# Patient Record
Sex: FEMALE | Race: WHITE | NOT HISPANIC OR LATINO | Employment: UNEMPLOYED | URBAN - METROPOLITAN AREA
[De-identification: names, ages, dates, MRNs, and addresses within clinical notes are randomized per-mention and may not be internally consistent; named-entity substitution may affect disease eponyms.]

---

## 2017-01-04 ENCOUNTER — ALLSCRIPTS OFFICE VISIT (OUTPATIENT)
Dept: OTHER | Facility: OTHER | Age: 62
End: 2017-01-04

## 2017-01-06 ENCOUNTER — APPOINTMENT (OUTPATIENT)
Dept: PHYSICAL THERAPY | Facility: MEDICAL CENTER | Age: 62
End: 2017-01-06
Payer: COMMERCIAL

## 2017-01-06 PROCEDURE — 97010 HOT OR COLD PACKS THERAPY: CPT

## 2017-01-06 PROCEDURE — 97110 THERAPEUTIC EXERCISES: CPT

## 2017-01-06 PROCEDURE — 97012 MECHANICAL TRACTION THERAPY: CPT

## 2017-01-09 ENCOUNTER — GENERIC CONVERSION - ENCOUNTER (OUTPATIENT)
Dept: OTHER | Facility: OTHER | Age: 62
End: 2017-01-09

## 2017-01-09 ENCOUNTER — APPOINTMENT (OUTPATIENT)
Dept: PHYSICAL THERAPY | Facility: MEDICAL CENTER | Age: 62
End: 2017-01-09
Payer: COMMERCIAL

## 2017-01-09 PROCEDURE — 97012 MECHANICAL TRACTION THERAPY: CPT

## 2017-01-09 PROCEDURE — 97110 THERAPEUTIC EXERCISES: CPT

## 2017-01-09 PROCEDURE — 97010 HOT OR COLD PACKS THERAPY: CPT

## 2017-01-19 ENCOUNTER — APPOINTMENT (OUTPATIENT)
Dept: LAB | Facility: MEDICAL CENTER | Age: 62
End: 2017-01-19
Payer: COMMERCIAL

## 2017-01-19 DIAGNOSIS — E78.5 HYPERLIPIDEMIA: ICD-10-CM

## 2017-01-19 LAB
CHOLEST SERPL-MCNC: 173 MG/DL (ref 50–200)
HDLC SERPL-MCNC: 85 MG/DL (ref 40–60)
LDLC SERPL CALC-MCNC: 73 MG/DL (ref 0–100)
TRIGL SERPL-MCNC: 75 MG/DL

## 2017-01-19 PROCEDURE — 36415 COLL VENOUS BLD VENIPUNCTURE: CPT

## 2017-01-19 PROCEDURE — 80061 LIPID PANEL: CPT

## 2017-02-19 ENCOUNTER — OFFICE VISIT (OUTPATIENT)
Dept: URGENT CARE | Facility: MEDICAL CENTER | Age: 62
End: 2017-02-19
Payer: COMMERCIAL

## 2017-02-19 PROCEDURE — 99213 OFFICE O/P EST LOW 20 MIN: CPT

## 2017-09-11 ENCOUNTER — TRANSCRIBE ORDERS (OUTPATIENT)
Dept: ADMINISTRATIVE | Facility: HOSPITAL | Age: 62
End: 2017-09-11

## 2017-09-11 DIAGNOSIS — M81.8 OTHER OSTEOPOROSIS, UNSPECIFIED PATHOLOGICAL FRACTURE PRESENCE: Primary | ICD-10-CM

## 2017-10-09 ENCOUNTER — ALLSCRIPTS OFFICE VISIT (OUTPATIENT)
Dept: OTHER | Facility: OTHER | Age: 62
End: 2017-10-09

## 2017-10-09 DIAGNOSIS — M81.0 AGE-RELATED OSTEOPOROSIS WITHOUT CURRENT PATHOLOGICAL FRACTURE: ICD-10-CM

## 2017-10-09 DIAGNOSIS — Z12.11 ENCOUNTER FOR SCREENING FOR MALIGNANT NEOPLASM OF COLON: ICD-10-CM

## 2017-10-09 DIAGNOSIS — Z12.31 ENCOUNTER FOR SCREENING MAMMOGRAM FOR MALIGNANT NEOPLASM OF BREAST: ICD-10-CM

## 2017-10-09 DIAGNOSIS — I10 ESSENTIAL (PRIMARY) HYPERTENSION: ICD-10-CM

## 2017-10-09 DIAGNOSIS — Z87.891 PERSONAL HISTORY OF NICOTINE DEPENDENCE: ICD-10-CM

## 2017-10-09 DIAGNOSIS — E78.5 HYPERLIPIDEMIA: ICD-10-CM

## 2017-10-20 ENCOUNTER — APPOINTMENT (OUTPATIENT)
Dept: LAB | Facility: MEDICAL CENTER | Age: 62
End: 2017-10-20
Payer: COMMERCIAL

## 2017-10-20 DIAGNOSIS — E78.5 HYPERLIPIDEMIA: ICD-10-CM

## 2017-10-20 DIAGNOSIS — I10 ESSENTIAL (PRIMARY) HYPERTENSION: ICD-10-CM

## 2017-10-20 LAB
ALBUMIN SERPL BCP-MCNC: 3.7 G/DL (ref 3.5–5)
ALP SERPL-CCNC: 66 U/L (ref 46–116)
ALT SERPL W P-5'-P-CCNC: 32 U/L (ref 12–78)
ANION GAP SERPL CALCULATED.3IONS-SCNC: 7 MMOL/L (ref 4–13)
AST SERPL W P-5'-P-CCNC: 18 U/L (ref 5–45)
BASOPHILS # BLD AUTO: 0.03 THOUSANDS/ΜL (ref 0–0.1)
BASOPHILS NFR BLD AUTO: 0 % (ref 0–1)
BILIRUB SERPL-MCNC: 0.44 MG/DL (ref 0.2–1)
BUN SERPL-MCNC: 10 MG/DL (ref 5–25)
CALCIUM SERPL-MCNC: 9.2 MG/DL (ref 8.3–10.1)
CHLORIDE SERPL-SCNC: 97 MMOL/L (ref 100–108)
CHOLEST SERPL-MCNC: 169 MG/DL (ref 50–200)
CO2 SERPL-SCNC: 27 MMOL/L (ref 21–32)
CREAT SERPL-MCNC: 0.58 MG/DL (ref 0.6–1.3)
EOSINOPHIL # BLD AUTO: 0.09 THOUSAND/ΜL (ref 0–0.61)
EOSINOPHIL NFR BLD AUTO: 1 % (ref 0–6)
ERYTHROCYTE [DISTWIDTH] IN BLOOD BY AUTOMATED COUNT: 12.9 % (ref 11.6–15.1)
GFR SERPL CREATININE-BSD FRML MDRD: 98 ML/MIN/1.73SQ M
GLUCOSE P FAST SERPL-MCNC: 97 MG/DL (ref 65–99)
HCT VFR BLD AUTO: 40.6 % (ref 34.8–46.1)
HDLC SERPL-MCNC: 76 MG/DL (ref 40–60)
HGB BLD-MCNC: 14 G/DL (ref 11.5–15.4)
LDLC SERPL CALC-MCNC: 74 MG/DL (ref 0–100)
LYMPHOCYTES # BLD AUTO: 2.21 THOUSANDS/ΜL (ref 0.6–4.47)
LYMPHOCYTES NFR BLD AUTO: 33 % (ref 14–44)
MCH RBC QN AUTO: 30.7 PG (ref 26.8–34.3)
MCHC RBC AUTO-ENTMCNC: 34.5 G/DL (ref 31.4–37.4)
MCV RBC AUTO: 89 FL (ref 82–98)
MONOCYTES # BLD AUTO: 0.69 THOUSAND/ΜL (ref 0.17–1.22)
MONOCYTES NFR BLD AUTO: 10 % (ref 4–12)
NEUTROPHILS # BLD AUTO: 3.67 THOUSANDS/ΜL (ref 1.85–7.62)
NEUTS SEG NFR BLD AUTO: 56 % (ref 43–75)
NRBC BLD AUTO-RTO: 0 /100 WBCS
PLATELET # BLD AUTO: 295 THOUSANDS/UL (ref 149–390)
PMV BLD AUTO: 10.3 FL (ref 8.9–12.7)
POTASSIUM SERPL-SCNC: 4.2 MMOL/L (ref 3.5–5.3)
PROT SERPL-MCNC: 7.4 G/DL (ref 6.4–8.2)
RBC # BLD AUTO: 4.56 MILLION/UL (ref 3.81–5.12)
SODIUM SERPL-SCNC: 131 MMOL/L (ref 136–145)
TRIGL SERPL-MCNC: 93 MG/DL
TSH SERPL DL<=0.05 MIU/L-ACNC: 0.72 UIU/ML (ref 0.36–3.74)
WBC # BLD AUTO: 6.7 THOUSAND/UL (ref 4.31–10.16)

## 2017-10-20 PROCEDURE — 80061 LIPID PANEL: CPT

## 2017-10-20 PROCEDURE — 36415 COLL VENOUS BLD VENIPUNCTURE: CPT

## 2017-10-20 PROCEDURE — 85025 COMPLETE CBC W/AUTO DIFF WBC: CPT

## 2017-10-20 PROCEDURE — 84443 ASSAY THYROID STIM HORMONE: CPT

## 2017-10-20 PROCEDURE — 80053 COMPREHEN METABOLIC PANEL: CPT

## 2017-10-23 ENCOUNTER — APPOINTMENT (OUTPATIENT)
Dept: LAB | Facility: MEDICAL CENTER | Age: 62
End: 2017-10-23
Payer: COMMERCIAL

## 2017-10-23 DIAGNOSIS — Z12.11 ENCOUNTER FOR SCREENING FOR MALIGNANT NEOPLASM OF COLON: ICD-10-CM

## 2017-10-23 LAB — HEMOCCULT STL QL IA: POSITIVE

## 2017-10-23 PROCEDURE — G0328 FECAL BLOOD SCRN IMMUNOASSAY: HCPCS

## 2017-11-08 ENCOUNTER — HOSPITAL ENCOUNTER (OUTPATIENT)
Dept: RADIOLOGY | Facility: MEDICAL CENTER | Age: 62
Discharge: HOME | End: 2017-11-08
Payer: COMMERCIAL

## 2017-11-08 ENCOUNTER — ALLSCRIPTS OFFICE VISIT (OUTPATIENT)
Dept: OTHER | Facility: OTHER | Age: 62
End: 2017-11-08

## 2017-11-08 DIAGNOSIS — Z87.891 PERSONAL HISTORY OF NICOTINE DEPENDENCE: ICD-10-CM

## 2017-11-08 DIAGNOSIS — Z12.31 ENCOUNTER FOR SCREENING MAMMOGRAM FOR MALIGNANT NEOPLASM OF BREAST: ICD-10-CM

## 2017-11-08 DIAGNOSIS — M81.8 OTHER OSTEOPOROSIS, UNSPECIFIED PATHOLOGICAL FRACTURE PRESENCE: ICD-10-CM

## 2017-11-08 PROCEDURE — 77063 BREAST TOMOSYNTHESIS BI: CPT

## 2017-11-08 PROCEDURE — G0202 SCR MAMMO BI INCL CAD: HCPCS

## 2017-11-08 PROCEDURE — 77080 DXA BONE DENSITY AXIAL: CPT

## 2017-11-24 DIAGNOSIS — E78.5 HYPERLIPIDEMIA: ICD-10-CM

## 2017-11-24 DIAGNOSIS — I10 ESSENTIAL (PRIMARY) HYPERTENSION: ICD-10-CM

## 2017-11-27 ENCOUNTER — APPOINTMENT (OUTPATIENT)
Dept: LAB | Facility: MEDICAL CENTER | Age: 62
End: 2017-11-27
Payer: COMMERCIAL

## 2017-11-27 DIAGNOSIS — I10 ESSENTIAL (PRIMARY) HYPERTENSION: ICD-10-CM

## 2017-11-27 DIAGNOSIS — E78.5 HYPERLIPIDEMIA: ICD-10-CM

## 2017-11-27 LAB
ANION GAP SERPL CALCULATED.3IONS-SCNC: 6 MMOL/L (ref 4–13)
BUN SERPL-MCNC: 17 MG/DL (ref 5–25)
CALCIUM SERPL-MCNC: 9.3 MG/DL (ref 8.3–10.1)
CHLORIDE SERPL-SCNC: 102 MMOL/L (ref 100–108)
CO2 SERPL-SCNC: 27 MMOL/L (ref 21–32)
CREAT SERPL-MCNC: 0.65 MG/DL (ref 0.6–1.3)
GFR SERPL CREATININE-BSD FRML MDRD: 94 ML/MIN/1.73SQ M
GLUCOSE P FAST SERPL-MCNC: 97 MG/DL (ref 65–99)
POTASSIUM SERPL-SCNC: 4.2 MMOL/L (ref 3.5–5.3)
SODIUM SERPL-SCNC: 135 MMOL/L (ref 136–145)

## 2017-11-27 PROCEDURE — 80048 BASIC METABOLIC PNL TOTAL CA: CPT

## 2017-11-27 PROCEDURE — 36415 COLL VENOUS BLD VENIPUNCTURE: CPT

## 2017-12-15 ENCOUNTER — ALLSCRIPTS OFFICE VISIT (OUTPATIENT)
Dept: OTHER | Facility: OTHER | Age: 62
End: 2017-12-15

## 2017-12-16 NOTE — PROGRESS NOTES
Assessment  1  Sinusitis (473 9) (J32 9)   2  Heart murmur (785 2) (R01 1)    Plan  Heart murmur, Sinusitis    · Follow-up visit in 2 weeks Evaluation and Treatment  Follow-up  Status: Hold For -Scheduling  Requested for: 35MHY0966  Sinusitis    · Amoxicillin-Pot Clavulanate 875-125 MG Oral Tablet; TAKE 1 TABLET EVERY 12HOURS DAILY    Discussion/Summary    Sinusitis: Likely bacterial as patient has tooth pain and temperature elevation  Will have her use Augmentin twice daily for 10 days  Patient instructed to yogurt while on antibiotics to help prevent diarrhea  Patient encouraged to stop smoking  Murmur: On exam  Recommended follow-up with PCP for workup  Follow-up in 2 weeks or sooner if needed  Possible side effects of new medications were reviewed with the patient/guardian today  The treatment plan was reviewed with the patient/guardian  The patient/guardian understands and agrees with the treatment plan      Chief Complaint  1  Cough   2  Nasal Symptoms    History of Present Illness  HPI: Patient presents with sinus pain  Started 4 days ago  Location is in her teeth  Described as a pressure sensation  Has associated nasal congestion and cough  Symptoms are getting worse  Symptoms are exacerbated when he turns on in the house  Not improved by anything  Tried some over-the-counter medication with little relief  Patient is a smoker  No history of asthma  Patient has sick contacts at home  Review of Systems   Constitutional: fever  Cardiovascular: no chest pain  Respiratory: no shortness of breath  Active Problems  1  Abnormal findings on diagnostic imaging of liver (793 3) (R93 2)   2  Acute foot pain, right (729 5) (M79 671)   3  Acute upper respiratory infection (465 9) (J06 9)   4  Anxiety (300 00) (F41 9)   5  Back pain (724 5) (M54 9)   6  Breast cancer screening (V76 10) (Z12 39)   7  Colon cancer screening (V76 51) (Z12 11)   8  Current tobacco use (305 1) (Z72 0)   9   Encounter for screening mammogram for malignant neoplasm of breast (V76 12) (Z12 31)   10  Encounter for vitamin deficiency screening (V77 99) (Z13 21)   11  Flu vaccine need (V04 81) (Z23)   12  Hemoptysis (786 30) (R04 2)   13  Hyperlipidemia (272 4) (E78 5)   14  Hypertension (401 9) (I10)   15  Intervertebral disc disorders with radiculopathy, lumbar region (724 4) (M51 16)   16  Lumbar stenosis (724 02) (M48 061)   17  Need for pneumococcal vaccination (V03 82) (Z23)   18  Need for pneumococcal vaccine (V03 82) (Z23)   19  Need for shingles vaccine (V04 89) (Z23)   20  Need for Tdap vaccination (V06 1) (Z23)   21  Osteoporosis (733 00) (M81 0)   22  Overweight (278 02) (E66 3)   23  Pain of upper extremity (729 5) (M79 603)   24  Positive FIT (fecal immunochemical test) (792 1) (R19 5)   25  Right hip pain (719 45) (M25 551)   26  Right shoulder pain (719 41) (M25 511)   27  Skin lesion (709 9) (L98 9)   28  Stiffness of right shoulder joint (719 51) (M25 611)   29  Tendinitis of right rotator cuff (726 10) (M75 81)   30  Visit for routine gyn exam (V72 31) (Z01 419)   31  Well adult on routine health check (V70 0) (Z00 00)   32  Yeast infection (112 9) (B37 9)    Past Medical History  1  History of Screening for thyroid disorder (V77 0) (Z13 29)   2  History of UTI symptoms (788 99) (R39 9)  Active Problems And Past Medical History Reviewed: The active problems and past medical history were reviewed and updated today  Family History  Mother    1  Family history of Heart Disease (V17 49)   2  Family history of Mother  At Age 80  Father    3  Family history of Diabetes Mellitus (V18 0)   4  Family history of Father  At Age 80   5  Family history of Heart Disease (V17 49)    Social History   · Caffeine Use   · Current tobacco use (305 1) (Z72 0)   · Former smoker (T75 42) (X48 542)   · Never Drank Alcohol   · Use of electronic cigarettes    Current Meds   1   Alendronate Sodium 70 MG Oral Tablet; take 1 tablet once weekly; Therapy: 98XFU1336 to (Evaluate:19Jan2018)  Requested for: 34HLX5749; Last Rx:91Azw0961 Ordered   2  Aspirin EC 81 MG Oral Tablet Delayed Release; Take 1 tablet daily Recorded   3  Atorvastatin Calcium 10 MG Oral Tablet; take 1 tablet by mouth every day; Therapy: 93SYC6151 to (Jorge Amanda)  Requested for: 93MYH4974; Last Rx:30Nov2017 Ordered   4  Calcium Plus Vitamin D CAPS; Therapy: (Recorded:83Zal6030) to Recorded   5  Cranberry CAPS; Therapy: (Recorded:27Kly5536) to Recorded   6  [employer] Red Yeast Rice CAPS; TAKE AS DIRECTED; Therapy: (Recorded:10Nvv1696) to Recorded   7  Cyclobenzaprine HCl - 10 MG Oral Tablet; one tablet tid prn; Therapy: 61LGF8243 to (Last Rx:09Oct2017)  Requested for: 18CHS1824; Status: ACTIVE - Renewal Denied Ordered   8  Fish Oil 1000 MG Oral Capsule; Therapy: (Recorded:29Wdn4212) to Recorded   9  Fluticasone Propionate 50 MCG/ACT Nasal Suspension; USE 2 SPRAYS IN EACH NOSTRIL ONCE DAILY Recorded   10  Lisinopril 20 MG Oral Tablet; TAKE 1 TABLET DAILY; Therapy: 51GSR0976 to (CFZHXJVI:52OEF0932)  Requested for: 58GHV4356; Last  Rx:42Ocs5117 Ordered   11  LORazepam 1 MG Oral Tablet; One tablet bid prn; Therapy: 27AEB5883 to (Last Rx:09Oct2017) Ordered   12  Multi Vitamin/Minerals TABS; TAKE 1 TABLET DAILY; Therapy: (Recorded:07Tpm9093) to Recorded   13  TraMADol HCl - 50 MG Oral Tablet; TAKE 1 TABLET EVERY 6 HOURS WITH FOOD; Therapy: 55DIG3609 to (John Graves)  Requested for: 56SIV6633; Last  Rx:04Nov2016 Ordered   14  Vitamin C CAPS; Therapy: (Recorded:97Zku0988) to Recorded   15  Vitamin E TABS; Therapy: (Recorded:30Tcl5293) to Recorded    The medication list was reviewed and updated today  Allergies  1   No Known Drug Allergies    Vitals   Recorded: 81Szb7193 01:16PM   Temperature 99 9 F   Heart Rate 72   Respiration 16   Systolic 485   Diastolic 70   Weight 592 lb    BMI Calculated 30 3   BSA Calculated 1 63     Physical Exam   Constitutional  General appearance: Abnormal   acutely ill  Eyes  Conjunctiva and lids: No swelling, erythema or discharge  Pupils and irises: Equal, round and reactive to light  Ears, Nose, Mouth, and Throat  External inspection of ears and nose: Normal    Otoscopic examination: Tympanic membranes translucent with normal light reflex  Canals patent without erythema  Nasal mucosa, septum, and turbinates: Abnormal   There was a purulent discharge from both nares  The bilateral nasal mucosa was edematous-- and-- red, but-- not pale/blue  Oropharynx: Abnormal   The posterior pharynx Postnasal drainage  , but-- was not erythematous-- and-- did not have an exudate  Pulmonary  Respiratory effort: No increased work of breathing or signs of respiratory distress  Auscultation of lungs: Clear to auscultation  Cardiovascular  Auscultation of heart: Abnormal   The heart rate was normal  The rhythm was regular  Heart sounds: normal S1-- and-- normal S2  A grade 2 systolic murmur was heard at the RUSB  Examination of extremities for edema and/or varicosities: Normal    Lymphatic  Palpation of lymph nodes in neck: Abnormal   bilateral anterior cervical node enlargement, but-- no posterior cervical node enlargement  Future Appointments    Date/Time Provider Specialty Site   10/10/2018 01:30 PM ANTHONY Arango  [address]   12/20/2017 05:30 PM ANTHONY Fletcher   G[facility]ology Adult [address]     Signatures   Electronically signed by : Caleb Clifford DO; Dec 15 2017  1:38PM EST                       (Author)

## 2017-12-20 ENCOUNTER — ALLSCRIPTS OFFICE VISIT (OUTPATIENT)
Dept: OTHER | Facility: OTHER | Age: 62
End: 2017-12-20

## 2018-01-08 ENCOUNTER — ALLSCRIPTS OFFICE VISIT (OUTPATIENT)
Dept: OTHER | Facility: OTHER | Age: 63
End: 2018-01-08

## 2018-01-10 NOTE — PROGRESS NOTES
Assessment    1  Osteoporosis (733 00) (M81 0)   2  Hyperlipidemia (272 4) (E78 5)   3  Hypertension (401 9) (I10)   4  Current tobacco use (305 1) (Z72 0)   5  Encounter for preventive health examination (V70 0) (Z00 00)   6  Overweight (278 02) (E66 3)    Plan  Anxiety    · LORazepam 1 MG Oral Tablet (Ativan); One tablet bid prn  Back pain    · Cyclobenzaprine HCl - 10 MG Oral Tablet; one tablet tid prn  Colon cancer screening    · (1) OCCULT BLOOD, FECAL IMMUNOCHEMICAL TEST; Status:Active - Retrospective  Authorization; Requested OFA:[id number];   Encounter for screening mammogram for malignant neoplasm of breast    · * MAMMO SCREENING BILATERAL W CAD; Status:Active - Retrospective Authorization; Requested YYN:[id number];   Flu vaccine need    · Fluzone Quadrivalent 0 5 ML Intramuscular Suspension Prefilled Syringe  Health Maintenance    · Begin or continue regular aerobic exercise  Gradually work up to at least 3 sessions of 30  minutes of exercise a week ; Status:Complete;   Done: 98APK1371  Hyperlipidemia, Hypertension    · (1) CBC/PLT/DIFF; Status:Active - Retrospective Authorization; Requested  VWB:[id number];    · (1) COMPREHENSIVE METABOLIC PANEL; Status:Active - Retrospective Authorization; Requested YTP:[id number];    · (1) LIPID PANEL, FASTING; Status:Active - Retrospective Authorization; Requested  XSF:[id number];    · (1) TSH; Status:Active - Retrospective Authorization; Requested MHR:[id number];   Osteoporosis    · * DXA BONE DENSITY SPINE HIP AND PELVIS; Status:Active - Retrospective  Authorization; Requested VRM:[id number]; Overweight    · We recommend that you bring your body mass index down to 26 ; Status:Complete;    Done: 00GMV3847   · You need to quit smoking ; Status:Complete;   Done: 37SGB4526  SocHx: Former smoker    · * CT LUNG SCREENING PROGRAM; Status:Active - Retrospective Authorization; Requested LXU:[id number];     Flu shot    Mammogram, Dexa scan, CT lung , hemoccult  Discussion/Summary    1  HTN-controlled  2  Cigarette smoker-discussed cessation  Offered CT lung cancer screening  3  Hyperlipidemia-due for BW  4  Overweight-advised weight loss  Needs to exercise  5  HM-needs mammogram, Dexa scan, flu shot  Will check Zostavax, Tdap  Eye checkup  Prefers hemoccult  Chief Complaint  The patient is here today for her yearly exam       History of Present Illness  HM, Adult Female: The patient is being seen for a health maintenance evaluation  General Health: The patient's health since the last visit is described as good  She has regular dental visits  She denies vision problems  Vision care includes an eye examination more than a year ago and Dr  She denies hearing loss  Lifestyle:  She consumes a diverse and healthy diet  (Eats whole grains, vegetables  Dietary calcium 1 daily  Caffiene 2 daily  )   She has weight concerns  She does not exercise regularly  (She has not been exercising; says she can't do it in the heat  )   She uses tobacco  (She quits smoking off and on  She says it is hard but she wants  )   She consumes alcohol  She reports occasional alcohol use  Screening:   HPI: Lexi Gutierrez says she has been doing well  She declines Gyn exam today  Her BP has been good  She is getting a root canal   Dr Diane Vitale  She is concerned about a lump in her jaw  No gout episodes  She is taking alendronate for her osteoporosis  No problems  Active Problems    1  Abnormal findings on diagnostic imaging of liver (793 3) (R93 2)   2  Acute foot pain, right (729 5) (M79 671)   3  Acute upper respiratory infection (465 9) (J06 9)   4  Anxiety (300 00) (F41 9)   5  Back pain (724 5) (M54 9)   6  Breast cancer screening (V76 10) (Z12 39)   7  Colon cancer screening (V76 51) (Z12 11)   8  Current tobacco use (305 1) (Z72 0)   9  Encounter for screening mammogram for malignant neoplasm of breast (V76 12)   (Z12 31)   10   Encounter for vitamin deficiency screening (V77 99) (Z13 21)   11  Flu vaccine need (V04 81) (Z23)   12  Hemoptysis (786 30) (R04 2)   13  Hyperlipidemia (272 4) (E78 5)   14  Hypertension (401 9) (I10)   15  Intervertebral disc disorders with radiculopathy, lumbar region (724 4) (M51 16)   16  Lumbar stenosis (724 02) (M48 061)   17  Need for pneumococcal vaccination (V03 82) (Z23)   18  Need for pneumococcal vaccine (V03 82) (Z23)   19  Osteoporosis (733 00) (M81 0)   20  Pain of upper extremity (729 5) (M79 603)   21  Right hip pain (719 45) (M25 551)   22  Right shoulder pain (719 41) (M25 511)   23  Skin lesion (709 9) (L98 9)   24  Stiffness of right shoulder joint (719 51) (M25 611)   25  Tendinitis of right rotator cuff (726 10) (M75 81)   26  Visit for routine gyn exam (V72 31) (Z01 419)   27  Well adult on routine health check (V70 0) (Z00 00)   28  Yeast infection (112 9) (B37 9)    Past Medical History    · History of Screening for thyroid disorder (V77 0) (Z13 29)   · History of UTI symptoms (788 99) (R39 9)    Family History  Mother    · Family history of Heart Disease (V17 49)   · Family history of Mother  At Age 80  Father    · Family history of Diabetes Mellitus (V18 0)   · Family history of Father  At Age 80   · Family history of Heart Disease (V17 49)    The family history was reviewed and updated today  Social History    · Caffeine Use   · Current tobacco use (305 1) (Z72 0)   · Former smoker (A74 46) (Z94 156)   · Never Drank Alcohol   · Use of electronic cigarettes    Current Meds   1  Alendronate Sodium 70 MG Oral Tablet; take 1 tablet once weekly; Therapy: 20JBS2139 to (Evaluate:2018)  Requested for: 24IND6010; Last   Rx:36Obl9166 Ordered   2  Aspirin EC 81 MG Oral Tablet Delayed Release; Take 1 tablet daily Recorded   3  Atorvastatin Calcium 10 MG Oral Tablet; take 1 tablet by mouth every day; Therapy: 36MGQ3552 to (Evaluate:82Lfh8015)  Requested for: 96WIQ7883; Last   Rx:2017 Ordered   4   Calcium Plus Vitamin D CAPS; Therapy: (Recorded:65Ljx8368) to Recorded   5  Cranberry CAPS; Therapy: (Recorded:72Gqb6692) to Recorded   6  [employer] Red Yeast Rice CAPS; TAKE AS DIRECTED; Therapy: (Recorded:44Iza4349) to Recorded   7  Cyclobenzaprine HCl - 10 MG Oral Tablet; one tablet tid prn; Therapy: 84DNT7970 to (Last Rx:10Oct2016)  Requested for: 16Oct2016 Ordered   8  Fish Oil 1000 MG Oral Capsule; Therapy: (Recorded:28Fin3548) to Recorded   9  Fluticasone Propionate 50 MCG/ACT Nasal Suspension; USE 2 SPRAYS IN EACH   NOSTRIL ONCE DAILY Recorded   10  Lisinopril-Hydrochlorothiazide 10-12 5 MG Oral Tablet; take 1 tablet by mouth every day; Therapy: 05NCC7802 to (Evaluate:10Oct2017)  Requested for: 24MZQ1100; Last    Rx:14Mar2017 Ordered   11  LORazepam 1 MG Oral Tablet; One tablet bid prn; Therapy: 61FUH7744 to (Last Rx:10Oct2016) Ordered   12  Multi Vitamin/Minerals TABS; TAKE 1 TABLET DAILY; Therapy: (Recorded:86Yvn1186) to Recorded   13  TraMADol HCl - 50 MG Oral Tablet; TAKE 1 TABLET EVERY 6 HOURS WITH FOOD; Therapy: 01JKS4261 to (Marv Mclain)  Requested for: 54KPY3662; Last    Rx:04Nov2016 Ordered   14  Vitamin C CAPS; Therapy: (Recorded:82Isr0545) to Recorded   15  Vitamin E TABS; Therapy: (Recorded:54Jap7381) to Recorded    Allergies    1  No Known Drug Allergies    Vitals   Recorded: 37SAM6197 04:56PM Recorded: 19JRR6452 12:58PM   Heart Rate  78   Respiration  18   Systolic 579,    Diastolic 80, LUE 80   Weight  151 lb 8 oz   BMI Calculated  30 6   BSA Calculated  1 64     Physical Exam    Constitutional   General appearance: No acute distress, well appearing and well nourished  Head and Face   Head and face: Normal     Ears, Nose, Mouth, and Throat   Otoscopic examination: Tympanic membranes translucent with normal light reflex  Canals patent without erythema  Oropharynx: Normal with no erythema, edema, exudate or lesions      Neck   Neck: Supple, symmetric, trachea midline, no masses  Thyroid: Normal, no thyromegaly  Pulmonary   Respiratory effort: No increased work of breathing or signs of respiratory distress  Auscultation of lungs: Clear to auscultation  Cardiovascular   Auscultation of heart: Normal rate and rhythm, normal S1 and S2, no murmurs  Carotid pulses: 2+ bilaterally  Abdominal aorta: Normal     Femoral pulses: 2+ bilaterally  Pedal pulses: 2+ bilaterally  Peripheral vascular exam: Normal     Examination of extremities for edema and/or varicosities: Normal     Lymphatic   Palpation of lymph nodes in neck: No lymphadenopathy  Palpation of lymph nodes in groin: No lymphadenopathy         Signatures   Electronically signed by : ANTHONY Cagle ; Oct  9 2017  5:04PM EST                       (Author)

## 2018-01-12 VITALS
SYSTOLIC BLOOD PRESSURE: 120 MMHG | WEIGHT: 142 LBS | TEMPERATURE: 98.5 F | BODY MASS INDEX: 28.63 KG/M2 | HEIGHT: 59 IN | HEART RATE: 78 BPM | DIASTOLIC BLOOD PRESSURE: 68 MMHG | RESPIRATION RATE: 18 BRPM

## 2018-01-14 VITALS
BODY MASS INDEX: 30.6 KG/M2 | SYSTOLIC BLOOD PRESSURE: 140 MMHG | DIASTOLIC BLOOD PRESSURE: 80 MMHG | RESPIRATION RATE: 18 BRPM | WEIGHT: 151.5 LBS | HEART RATE: 78 BPM

## 2018-01-15 NOTE — PROGRESS NOTES
Assessment    1  Acute foot pain, right (729 5) (M79 671)   2  Breast cancer screening (V76 10) (Z12 39)   3  Hypertension (401 9) (I10)   4  Encounter for vitamin deficiency screening (V77 99) (Z13 21)   5  Hyperlipidemia (272 4) (E78 5)   6  Visit for routine gyn exam (V72 31) (Z01 419)   7  Osteoporosis (733 00) (M81 0)    Plan  Acute foot pain, right    · * XR FOOT 3+ VIEW RIGHT; Status:Active - Retrospective By Protocol Authorization; Requested for:05Oct2016;   Acute foot pain, right, Hypertension    · (1) URIC ACID; Status:Active - Retrospective Authorization; Requested for:05Oct2016;   Breast cancer screening    · * MAMMO SCREENING BILATERAL W CAD; Status:Hold For - Scheduling,Retrospective  By Protocol Authorization; Requested for:28Oct2016;   Encounter for vitamin deficiency screening, Osteoporosis    · (1) VITAMIN D 25-HYDROXY; Status:Active - Retrospective Authorization; Requested  for:05Oct2016;   Hyperlipidemia, Hypertension    · (1) LIPID PANEL, FASTING; Status:Active - Retrospective Authorization; Requested  for:05Oct2016;   Hypertension    · (1) CBC/PLT/DIFF; Status:Active - Retrospective Authorization; Requested  for:05Oct2016;    · (1) COMPREHENSIVE METABOLIC PANEL; Status:Active - Retrospective Authorization; Requested for:05Oct2016;    · (1) OCCULT BLOOD, FECAL IMMUNOCHEMICAL TEST; Status:Active - Retrospective By  Protocol Authorization; Requested for:05Oct2016;    · (1) TSH; Status:Active - Retrospective Authorization; Requested for:05Oct2016;   Osteoporosis    · * DXA BONE DENSITY SPINE HIP AND PELVIS; Status:Hold For -  Scheduling,Retrospective By Protocol Authorization; Requested [id number];   Right shoulder pain    · TraMADol HCl - 50 MG Oral Tablet  Visit for routine gyn exam    · (Q) THINPREP TIS PAP RFX HPV; Status:Active - Retrospective By Protocol  Authorization;  Requested DBJ:[id number];   PAP: : 10/1/2014  : 3/2002  Well adult on routine health check    · Garlic CAPS    Check BW, uric acid, hemoccult, mammogram  Dexa scan next year  XRay right foot  Flu shot  Discussion/Summary    1  HM-due for mammogram , hemoccult  Updated immunizations  Flu shot today  2  Osteoporosis-doing well with alendronate  Last Dexa scan 12/15; will repeat next year  3  HTN-borderline  Monitor at home  Ami [employer]end However if has gout may stop the HCTZ and will need med adjustment  4  Foot pain-suspect gout  5  Hyperlipidemia-on red yeast rice  Due for BW   6  Gyn exam     Chief Complaint  Complains of right foot pain for 2 weeks  History of Present Illness  HM, Adult Female: The patient is being seen for a health maintenance and gynecology evaluation  General Health: The patient's health since the last visit is described as good  She has regular dental visits  She denies vision problems  Vision care includes an eye examination more than a year ago  She has hearing loss  hearing is slightly decreased  Lifestyle:  She has weight concerns  She exercises regularly  She does not use tobacco  (She has been using ecigarette but says she has not been smoking  )   She denies alcohol use  Dietary calcium several daily  Reproductive health: the patient is postmenopausal    Screening:   HPI: Austin nelson complains of pain in her left foot for the past 2 weeks  Very painful to walk  to walk  Her BP was taken at the dentist and was normal  Taking lisinopril/HCTZ and doing well  Taking her alendronate and doing well  She does get heartburn more often  Seems about 1-2 times a week  Aggravated by eating late, tomato sauce  Shge        Review of Systems    Genitourinary: Has stress incontinence with a cough occ , but no dysuria, no pelvic pain, no vaginal discharge and no unexplained vaginal bleeding  Active Problems    1  Abnormal findings on diagnostic imaging of liver (793 3) (R93 2)   2  Anxiety (300 00) (F41 9)   3  Back pain (724 5) (M54 9)   4  Colon cancer screening (V76 51) (Z12 11)   5   Current tobacco use (305 1) (Z72 0)   6  Encounter for screening mammogram for malignant neoplasm of breast (V76 12)   (Z12 31)   7  Flu vaccine need (V04 81) (Z23)   8  Hemoptysis (786 30) (R04 2)   9  Hyperlipidemia (272 4) (E78 5)   10  Hypertension (401 9) (I10)   11  Need for pneumococcal vaccination (V03 82) (Z23)   12  Osteoporosis (733 00) (M81 0)   13  Pain of upper extremity (729 5) (M79 603)   14  Right shoulder pain (719 41) (M25 511)   15  Skin lesion (709 9) (L98 9)   16  Stiffness of right shoulder joint (719 51) (M25 611)   17  Tendinitis of right rotator cuff (726 10) (M75 81)   18  Visit for routine gyn exam (V72 31) (Z01 419)   19  Well adult on routine health check (V70 0) (Z00 00)    Past Medical History    · History of Screening for thyroid disorder (V77 0) (Z13 29)   · History of UTI symptoms (788 99) (R39 9)    Family History  Mother    · Family history of Heart Disease (V17 49)   · Family history of Mother  At Age 80  Father    · Family history of Diabetes Mellitus (V18 0)   · Family history of Father  At Age 80   · Family history of Heart Disease (V17 49)    Social History    · Caffeine Use   · Current tobacco use (305 1) (Z72 0)   · Former smoker (V15 82) (I33 813)   · Never Drank Alcohol   · Use of electronic cigarettes    Current Meds   1  Alendronate Sodium 70 MG Oral Tablet; take 1 tablet once weekly; Therapy: 86MYP6138 to (Evaluate:85Hsz7192)  Requested for: 2016; Last   Rx:2016 Ordered   2  Aspirin EC 81 MG Oral Tablet Delayed Release; Take 1 tablet daily Recorded   3  Calcium Plus Vitamin D CAPS; Therapy: (Recorded:04Apx2519) to Recorded   4  Cranberry CAPS; Therapy: (Recorded:65Syj9745) to Recorded   5  [employer] Red Yeast Rice CAPS; TAKE AS DIRECTED; Therapy: (Recorded:14Myn6525) to Recorded   6  Cyclobenzaprine HCl - 10 MG Oral Tablet; one tablet tid prn; Therapy: 90YOF0186 to (Evaluate:2015)  Requested for: 2015; Last   Rx:2015 Ordered   7   Fish Oil 1000 MG Oral Capsule; Therapy: (Recorded:94Fzh7254) to Recorded   8  Fluticasone Propionate 50 MCG/ACT Nasal Suspension; USE 2 SPRAYS IN EACH   NOSTRIL ONCE DAILY Recorded   9  Garlic CAPS; Therapy: (Recorded:05Jbx2121) to Recorded   10  Lisinopril-Hydrochlorothiazide 10-12 5 MG Oral Tablet; take 1 tablet by mouth every day; Therapy: 51XXG3293 to ([id number])  Requested for: 93KKP7328; Last    Rx:68Uee3537 Ordered   11  LORazepam 1 MG Oral Tablet; One tablet bid prn; Therapy: 27TUV7759 to (Last Rx:46Xxm0868) Ordered   12  Multi Vitamin/Minerals TABS; TAKE 1 TABLET DAILY; Therapy: (Recorded:74Pkm6889) to Recorded   13  TraMADol HCl - 50 MG Oral Tablet; TAKE 1 TABLET 3 TIMES DAILY AS NEEDED; Therapy: 82YQB0612 to ([phone]); Last Rx:71Qko0844 Ordered   14  Vitamin C CAPS; Therapy: (Recorded:69Axz6574) to Recorded   15  Vitamin E TABS; Therapy: (Recorded:10Ymt5454) to Recorded    Allergies    1  No Known Drug Allergies    Vitals   Recorded: 98SMT4905 03:41VF   Systolic 509   Diastolic 76   Heart Rate 90   Respiration 20   LMP Approximately Mar-2002   Height 4 ft 11 in   Weight 147 lb 4 00 oz   BMI Calculated 29 74   BSA Calculated 1 62     Physical Exam    Constitutional   General appearance: No acute distress, well appearing and well nourished  Neck   Neck: Supple, symmetric, trachea midline, no masses  Thyroid: Normal, no thyromegaly  Pulmonary   Respiratory effort: No increased work of breathing or signs of respiratory distress  Auscultation of lungs: Clear to auscultation  Cardiovascular   Auscultation of heart: Normal rate and rhythm, normal S1 and S2, no murmurs  Carotid pulses: 2+ bilaterally  Abdominal aorta: Normal     Femoral pulses: 2+ bilaterally  Pedal pulses: 2+ bilaterally      Peripheral vascular exam: Normal     Examination of extremities for edema and/or varicosities: Normal     Chest   Breasts: Normal, no dimpling or skin changes appreciated  Palpation of breasts and axillae: Normal, no masses palpated  Abdomen   Abdomen: Non-tender, no masses  Liver and spleen: No hepatomegaly or splenomegaly  Anus, perineum, and rectum: Normal sphincter tone, no masses, no prolapse  Stool sample for occult blood: Negative  Genitourinary   External genitalia and vagina: Normal, no lesions appreciated  Bladder: Not distended, no tenderness  Cervix: Normal, no lesions  Uterus: Normal size, no tenderness, no masses  Adnexa/Parametria: Normal, no masses or tenderness  Lymphatic   Palpation of lymph nodes in neck: No lymphadenopathy  Palpation of lymph nodes in axillae: No lymphadenopathy  Palpation of lymph nodes in groin: No lymphadenopathy      Psychiatric   Mood and affect: Normal        Signatures   Electronically signed by : ANTHONY Bose ; Oct  5 2016 12:44PM EST                       (Author)

## 2018-01-17 ENCOUNTER — ANESTHESIA EVENT (OUTPATIENT)
Dept: PERIOP | Facility: AMBULARY SURGERY CENTER | Age: 63
End: 2018-01-17
Payer: COMMERCIAL

## 2018-01-18 NOTE — PROGRESS NOTES
Chief Complaint  Surya Askew is here today for a f/up BP check because of medication change  Her sodium was low on her last CMP , HCTZ element was removed from her medications and her Lisinopril was increased from 10mg  to 20mg  daily  She feels fine on those changes  BP was 134/78 and a pulse of 80 at her NV today  She says she has some personal stress currently and may contribute to a more elevated level  She wanted to know if she needs a repeat lab to check her sodium level in the future  Aware will let her know once NV note is addressed  Active Problems    1  Abnormal findings on diagnostic imaging of liver (793 3) (R93 2)   2  Acute foot pain, right (729 5) (M79 671)   3  Acute upper respiratory infection (465 9) (J06 9)   4  Anxiety (300 00) (F41 9)   5  Back pain (724 5) (M54 9)   6  Breast cancer screening (V76 10) (Z12 39)   7  Colon cancer screening (V76 51) (Z12 11)   8  Current tobacco use (305 1) (Z72 0)   9  Encounter for screening mammogram for malignant neoplasm of breast (V76 12)   (Z12 31)   10  Encounter for vitamin deficiency screening (V77 99) (Z13 21)   11  Flu vaccine need (V04 81) (Z23)   12  Hemoptysis (786 30) (R04 2)   13  Hyperlipidemia (272 4) (E78 5)   14  Hypertension (401 9) (I10)   15  Intervertebral disc disorders with radiculopathy, lumbar region (724 4) (M51 16)   16  Lumbar stenosis (724 02) (M48 061)   17  Need for pneumococcal vaccination (V03 82) (Z23)   18  Need for pneumococcal vaccine (V03 82) (Z23)   19  Need for shingles vaccine (V04 89) (Z23)   20  Need for Tdap vaccination (V06 1) (Z23)   21  Osteoporosis (733 00) (M81 0)   22  Overweight (278 02) (E66 3)   23  Pain of upper extremity (729 5) (M79 603)   24  Positive FIT (fecal immunochemical test) (792 1) (R19 5)   25  Right hip pain (719 45) (M25 551)   26  Right shoulder pain (719 41) (M25 511)   27  Skin lesion (709 9) (L98 9)   28  Stiffness of right shoulder joint (759 51) (X58 450)   29   Tendinitis of right rotator cuff (726 10) (M75 81)   30  Visit for routine gyn exam (V72 31) (Z01 419)   31  Well adult on routine health check (V70 0) (Z00 00)   32  Yeast infection (112 9) (B37 9)    Current Meds   1  Alendronate Sodium 70 MG Oral Tablet; take 1 tablet once weekly; Therapy: 79ODO8208 to (Evaluate:19Jan2018)  Requested for: 96AUN1630; Last   Rx:26Ctn2393 Ordered   2  Aspirin EC 81 MG Oral Tablet Delayed Release; Take 1 tablet daily Recorded   3  Atorvastatin Calcium 10 MG Oral Tablet; take 1 tablet by mouth every day; Therapy: 22TJS4280 to (Evaluate:53Ugk0359)  Requested for: 52HHT1693; Last   Rx:06Jun2017 Ordered   4  Calcium Plus Vitamin D CAPS; Therapy: (Recorded:70Hzb9576) to Recorded   5  Cranberry CAPS; Therapy: (Recorded:13Jeq1805) to Recorded   6  [employer]S Red Yeast Rice CAPS; TAKE AS DIRECTED; Therapy: (Recorded:41Dpp8806) to Recorded   7  Cyclobenzaprine HCl - 10 MG Oral Tablet; one tablet tid prn; Therapy: 24HCP8097 to (Last Rx:09Oct2017)  Requested for: 12Oct2017 Ordered   8  Fish Oil 1000 MG Oral Capsule; Therapy: (Recorded:25Mcp5877) to Recorded   9  Fluticasone Propionate 50 MCG/ACT Nasal Suspension; USE 2 SPRAYS IN EACH   NOSTRIL ONCE DAILY Recorded   10  Lisinopril 20 MG Oral Tablet; TAKE 1 TABLET DAILY; Therapy: 66XVF6977 to (25 596077)  Requested for: 23Oct2017; Last    Rx:23Oct2017 Ordered   11  LORazepam 1 MG Oral Tablet (Ativan); One tablet bid prn; Therapy: 78RFZ7770 to (Last Rx:09Oct2017) Ordered   12  Multi Vitamin/Minerals TABS; TAKE 1 TABLET DAILY; Therapy: (Recorded:17Iwv4668) to Recorded   13  TraMADol HCl - 50 MG Oral Tablet (Ultram); TAKE 1 TABLET EVERY 6 HOURS WITH    FOOD; Therapy: 39COF8717 to (Thony Fairchild)  Requested for: 70HKV7685; Last    Rx:04Nov2016 Ordered   14  Vitamin C CAPS; Therapy: (Recorded:87Iwk3408) to Recorded   15  Vitamin E TABS; Therapy: (Recorded:64Bts4567) to Recorded    Allergies    1   No Known Drug Allergies    Vitals  Signs    Heart Rate: 80  Systolic: 948  Diastolic: 78    Future Appointments    Date/Time Provider Specialty Site   10/10/2018 01:30 PM ANTHONY Sweet  [address] [facility]   12/20/2017 02:40 PM ANTHONY Cid   Gastroenterology Adult [address]     Signatures   Electronically signed by : ANTHONY Pickard ; Nov 10 2017 12:40AM EST

## 2018-01-22 VITALS
DIASTOLIC BLOOD PRESSURE: 70 MMHG | SYSTOLIC BLOOD PRESSURE: 144 MMHG | HEART RATE: 72 BPM | RESPIRATION RATE: 16 BRPM | WEIGHT: 150 LBS | TEMPERATURE: 99.9 F | BODY MASS INDEX: 30.3 KG/M2

## 2018-01-22 VITALS — DIASTOLIC BLOOD PRESSURE: 78 MMHG | HEART RATE: 80 BPM | SYSTOLIC BLOOD PRESSURE: 134 MMHG

## 2018-01-23 VITALS
WEIGHT: 149.5 LBS | RESPIRATION RATE: 20 BRPM | SYSTOLIC BLOOD PRESSURE: 126 MMHG | HEART RATE: 90 BPM | DIASTOLIC BLOOD PRESSURE: 78 MMHG | BODY MASS INDEX: 30.2 KG/M2

## 2018-01-23 VITALS
DIASTOLIC BLOOD PRESSURE: 66 MMHG | OXYGEN SATURATION: 97 % | TEMPERATURE: 98.3 F | WEIGHT: 148.5 LBS | SYSTOLIC BLOOD PRESSURE: 130 MMHG | BODY MASS INDEX: 29.94 KG/M2 | HEART RATE: 88 BPM | RESPIRATION RATE: 14 BRPM | HEIGHT: 59 IN

## 2018-01-23 NOTE — CONSULTS
Chief Complaint    Heme positive stool      History of Present Illness    59-year-old female with history of hypertension, hyperlipidemia was noted to have heme-positive stool on fecal occult blood testing  Patient has regular bowel movements and denies any blood or mucus in the stool  Appetite is good and denies any recent weight loss  Denies any abdominal pain, nausea, or vomiting  She has problems with acid reflux for which she takes Pepcid with help  Denies any difficulty swallowing  She never had colonoscopy in the past     Currently she is on antibiotics for sinusitis      The history was obtained today from the patient and I agree with the documented history  Review of Systems    Constitutional: No fever, no chills, feels well, no tiredness, no recent weight gain or weight loss  Eyes: No complaints of eye pain, no red eyes, no eyesight problems, no discharge, no dry eyes, no itching of eyes  ENT: no complaints of earache, no loss of hearing, no nose bleeds, no nasal discharge, no sore throat, no hoarseness  Cardiovascular: No complaints of slow heart rate, no fast heart rate, no chest pain, no palpitations, no leg claudication, no lower extremity edema  Respiratory: No complaints of shortness of breath, no wheezing, no cough, no SOB on exertion, no orthopnea, no PND  Gastrointestinal: as noted in HPI  Genitourinary: No complaints of dysuria, no incontinence, no pelvic pain, no dysmenorrhea, no vaginal discharge or bleeding  Musculoskeletal: arthralgias, but no joint swelling, no limb pain, no myalgias, no joint stiffness and no limb swelling  Integumentary: No complaints of skin rash or lesions, no itching, no skin wounds, no breast pain or lump  Neurological: No complaints of headache, no confusion, no convulsions, no numbness, no dizziness or fainting, no tingling, no limb weakness, no difficulty walking     Psychiatric: Not suicidal, no sleep disturbance, no anxiety or depression, no change in personality, no emotional problems  Endocrine: No complaints of proptosis, no hot flashes, no muscle weakness, no deepening of the voice, no feelings of weakness  Hematologic/Lymphatic: No complaints of swollen glands, no swollen glands in the neck, does not bleed easily, does not bruise easily  Active Problems    1  Abnormal findings on diagnostic imaging of liver (793 3) (R93 2)   2  Acute foot pain, right (729 5) (M79 671)   3  Acute upper respiratory infection (465 9) (J06 9)   4  Anxiety (300 00) (F41 9)   5  Back pain (724 5) (M54 9)   6  Breast cancer screening (V76 10) (Z12 39)   7  Colon cancer screening (V76 51) (Z12 11)   8  Current tobacco use (305 1) (Z72 0)   9  Encounter for screening mammogram for malignant neoplasm of breast (V76 12)   (Z12 31)   10  Encounter for vitamin deficiency screening (V77 99) (Z13 21)   11  Flu vaccine need (V04 81) (Z23)   12  Heart murmur (785 2) (R01 1)   13  Hemoptysis (786 30) (R04 2)   14  Hyperlipidemia (272 4) (E78 5)   15  Hypertension (401 9) (I10)   16  Intervertebral disc disorders with radiculopathy, lumbar region (724 4) (M51 16)   17  Lumbar stenosis (724 02) (M48 061)   18  Need for pneumococcal vaccination (V03 82) (Z23)   19  Need for pneumococcal vaccine (V03 82) (Z23)   20  Need for shingles vaccine (V04 89) (Z23)   21  Need for Tdap vaccination (V06 1) (Z23)   22  Osteoporosis (733 00) (M81 0)   23  Overweight (278 02) (E66 3)   24  Pain of upper extremity (729 5) (M79 603)   25  Positive FIT (fecal immunochemical test) (792 1) (R19 5)   26  Right hip pain (719 45) (M25 551)   27  Right shoulder pain (719 41) (M25 511)   28  Sinusitis (473 9) (J32 9)   29  Skin lesion (709 9) (L98 9)   30  Stiffness of right shoulder joint (719 51) (M25 611)   31  Tendinitis of right rotator cuff (726 10) (M75 81)   32  Visit for routine gyn exam (V72 31) (Z01 419)   33  Well adult on routine health check (V70 0) (Z00 00)   34   Yeast infection (112 9) (B37 9)    Past Medical History    · History of Screening for thyroid disorder (V77 0) (Z13 29)   · History of UTI symptoms (788 99) (R39 9)    The active problems and past medical history were reviewed and updated today  Surgical History    The surgical history was reviewed and updated today  Family History    · Family history of Heart Disease (V17 49)   · Family history of Mother  At Age 80    · Family history of Diabetes Mellitus (V18 0)   · Family history of Father  At Age 80   · Family history of Heart Disease (V17 49)    The family history was reviewed and updated today  Social History    · Caffeine Use   · Current tobacco use (305 1) (Z72 0)   · Former smoker (V15 82) (E81 074)   · Never Drank Alcohol   · Use of electronic cigarettes  The social history was reviewed and updated today  The social history was reviewed and is unchanged  Current Meds   1  Alendronate Sodium 70 MG Oral Tablet; take 1 tablet once weekly; Therapy: 08BUZ2580 to (Evaluate:2018)  Requested for: 52QSJ7545; Last   Rx:22Rdb9618 Ordered   2  Amoxicillin-Pot Clavulanate 875-125 MG Oral Tablet; TAKE 1 TABLET EVERY 12 HOURS   DAILY; Therapy: 27JDW2310 to (Evaluate:19Ccn9464)  Requested for: 16JOH7848; Last   Rx:53Qpd7501 Ordered   3  Aspirin EC 81 MG Oral Tablet Delayed Release; Take 1 tablet daily Recorded   4  Atorvastatin Calcium 10 MG Oral Tablet; take 1 tablet by mouth every day; Therapy: 92DLL8549 to (Melissa Kennedy)  Requested for: 16OLB9721; Last   Rx:2017 Ordered   5  Calcium Plus Vitamin D CAPS; Therapy: (Recorded:70Aoj7174) to Recorded   6  Cranberry CAPS; Therapy: (Recorded:55Wfe0181) to Recorded   7  [employer]S Red Yeast Rice CAPS; TAKE AS DIRECTED; Therapy: (Recorded:06Ool5076) to Recorded   8  Cyclobenzaprine HCl - 10 MG Oral Tablet; one tablet tid prn;    Therapy: 58SVK9648 to (Last Rx:2017)  Requested for: 95HGO8146; Status: ACTIVE   - Renewal Denied Ordered   9  Fish Oil 1000 MG Oral Capsule; Therapy: (Recorded:09Iut7552) to Recorded   10  Fluticasone Propionate 50 MCG/ACT Nasal Suspension; USE 2 SPRAYS IN EACH    NOSTRIL ONCE DAILY Recorded   11  Lisinopril 20 MG Oral Tablet; TAKE 1 TABLET DAILY; Therapy: 31OWZ5561 to (GZPFBBYI:11QGK1438)  Requested for: 62ZRG1898; Last    Rx:78Rky3135 Ordered   12  LORazepam 1 MG Oral Tablet; One tablet bid prn; Therapy: 04ETD8306 to (Last Rx:09Oct2017) Ordered   13  Multi Vitamin/Minerals TABS; TAKE 1 TABLET DAILY; Therapy: (Recorded:94Bts3549) to Recorded   14  TraMADol HCl - 50 MG Oral Tablet; TAKE 1 TABLET EVERY 6 HOURS WITH FOOD; Therapy: 57PXQ0548 to (Maryln Shock)  Requested for: 77LJE6314; Last    Rx:10Zvi8956 Ordered   15  Vitamin C CAPS; Therapy: (Recorded:19Hus6278) to Recorded   16  Vitamin E TABS; Therapy: (Recorded:23Wnk7488) to Recorded    The medication list was reviewed and updated today  Allergies    1  No Known Drug Allergies    Vitals   Recorded: 81Ted8234 05:37PM   Temperature 98 3 F   Heart Rate 88   Respiration 14   Systolic 650   Diastolic 66   Height 4 ft 11 in   Weight 148 lb 8 oz   BMI Calculated 29 99   BSA Calculated 1 62   O2 Saturation 97     Physical Exam    Constitutional   General appearance: No acute distress, well appearing and well nourished  Eyes   Conjunctiva and lids: No swelling, erythema or discharge  Pupils and irises: Equal, round and reactive to light  Ears, Nose, Mouth, and Throat   External inspection of ears and nose: Normal     Oropharynx: Normal with no erythema, edema, exudate or lesions  Pulmonary   Respiratory effort: No increased work of breathing or signs of respiratory distress  Auscultation of lungs: Clear to auscultation  Cardiovascular   Palpation of heart: Normal PMI, no thrills  Auscultation of heart: Normal rate and rhythm, normal S1 and S2, without murmurs      Examination of extremities for edema and/or varicosities: Normal     Carotid pulses: Normal     Abdomen   Abdomen: Non-tender, no masses  Liver and spleen: No hepatomegaly or splenomegaly  Lymphatic   Palpation of lymph nodes in neck: No lymphadenopathy  Musculoskeletal   Gait and station: Normal     Digits and nails: Normal without clubbing or cyanosis  Inspection/palpation of joints, bones, and muscles: Normal     Skin   Skin and subcutaneous tissue: Normal without rashes or lesions  Psychiatric   Orientation to person, place, and time: Normal     Mood and affect: Normal          Assessment    1  GERD (gastroesophageal reflux disease) (530 81) (K21 9)   2  Positive FIT (fecal immunochemical test) (792 1) (R19 5)    Plan  GERD (gastroesophageal reflux disease), Positive FIT (fecal immunochemical test)    · EGD; Status:Active; Requested for:16Vdn9696;    Perform:[facility]; Due:44Npw2509; Last Updated By:Socorro Jean-Baptiste; 12/20/2017 5:57:33 PM;Ordered; For:GERD (gastroesophageal reflux disease), Positive FIT (fecal immunochemical test); Ordered By:Valerie Swenson; Positive FIT (fecal immunochemical test)    · Suprep Bowel Prep Kit 17 5-3 13-1 6 GM/180ML Oral Solution; USE AS DIRECTED   Rx By: Flores Buenrostro; Dispense: 0 Days ; #:1 X 177 ML Bottle (2 Bottles); Refill: 0; For: Positive FIT (fecal immunochemical test); JEAN = N; Verified Transmission to [facility] #[id number] Last Updated By: System, Toura; 12/20/2017 5:54:11 PM   · COLONOSCOPY (GI, SURG); Status:Active; Requested for:45Etc1725;    Perform:[facility]; Due:36Pxw7235; Last Updated By:Socorro Jean-Baptiste; 12/20/2017 5:57:33 PM;Ordered; For:Positive FIT (fecal immunochemical test); Ordered By:Valerie Swenson; Discussion/Summary      ASSESSMENT:    #1  Occult GI bleeding- possible from hemorrhoids but should rule out colorectal lesions including polyps or malignancy  Should also rule out upper GI causes  #2  Gastroesophageal reflux disease    #3   Hypertension, hyperlipidemia    PLAN:    #1  Schedule for both EGD and colonoscopy  #2  Continue pepcid regularly    #3  Patient was explained about the lifestyle and dietary modifications  Advised to avoid fatty foods, chocolates, caffeine, alcohol and any other triggering foods  Avoid eating for at least 3 hours before going to bed  #4  Advised about high-fiber diet    #5  Patient was given instructions about the colonoscopy prep  #6  Patient was explained about the risks and benefits of the procedure  Risks including but not limited to bleeding, infection, perforation were explained in detail  Also explained about less than 100% sensitivity with the exam and other alternatives  The patient was counseled regarding instructions for management, risk factor reductions, patient and family education, risks and benefits of treatment options, importance of compliance with treatment        Future Appointments    Signatures   Electronically signed by : ANTHONY Mena ; Dec 21 2017  8:59AM EST                       (Author)

## 2018-01-23 NOTE — CONSULTS
Chief Complaint    Heme positive stool      History of Present Illness    56-year-old female with history of hypertension, hyperlipidemia, GERD was noted to have heme-positive stool on routine fecal occult blood testing  She never had colonoscopy in the past  She has regular bowel movements and denies any blood or mucus in the stool  She gives history of GERD for which he takes Pepcid with help  Good appetite, no recent weight loss  Denies any abdominal pain, nausea or vomiting  She takes Advil occasionally for back pain  Currently she is on antibiotics for sinusitis  The history was obtained today from the patient and I agree with the documented history  Review of Systems    Constitutional: No fever, no chills, feels well, no tiredness, no recent weight gain or weight loss  Eyes: No complaints of eye pain, no red eyes, no eyesight problems, no discharge, no dry eyes, no itching of eyes  ENT: no complaints of earache, no loss of hearing, no nose bleeds, no nasal discharge, no sore throat, no hoarseness  Cardiovascular: No complaints of slow heart rate, no fast heart rate, no chest pain, no palpitations, no leg claudication, no lower extremity edema  Respiratory: shortness of breath, cough and shortness of breath during exertion, but no orthopnea, no wheezing and no PND  Gastrointestinal: as noted in HPI  Genitourinary: No complaints of dysuria, no incontinence, no pelvic pain, no dysmenorrhea, no vaginal discharge or bleeding  Musculoskeletal: arthralgias, but no joint swelling, no limb pain, no myalgias, no joint stiffness and no limb swelling  Integumentary: No complaints of skin rash or lesions, no itching, no skin wounds, no breast pain or lump  Neurological: No complaints of headache, no confusion, no convulsions, no numbness, no dizziness or fainting, no tingling, no limb weakness, no difficulty walking     Psychiatric: Not suicidal, no sleep disturbance, no anxiety or depression, no change in personality, no emotional problems  Endocrine: No complaints of proptosis, no hot flashes, no muscle weakness, no deepening of the voice, no feelings of weakness  Hematologic/Lymphatic: No complaints of swollen glands, no swollen glands in the neck, does not bleed easily, does not bruise easily  Active Problems    1  Abnormal findings on diagnostic imaging of liver (793 3) (R93 2)   2  Acute foot pain, right (729 5) (M79 671)   3  Acute upper respiratory infection (465 9) (J06 9)   4  Anxiety (300 00) (F41 9)   5  Back pain (724 5) (M54 9)   6  Blood in stool (578 1) (K92 1)   7  Breast cancer screening (V76 10) (Z12 39)   8  Colon cancer screening (V76 51) (Z12 11)   9  Current tobacco use (305 1) (Z72 0)   10  Encounter for screening mammogram for malignant neoplasm of breast (V76 12)    (Z12 31)   11  Encounter for vitamin deficiency screening (V77 99) (Z13 21)   12  Flu vaccine need (V04 81) (Z23)   13  GERD (gastroesophageal reflux disease) (530 81) (K21 9)   14  Heart murmur (785 2) (R01 1)   15  Hemoptysis (786 30) (R04 2)   16  Hemorrhoids (455 6) (K64 9)   17  Hyperlipidemia (272 4) (E78 5)   18  Hypertension (401 9) (I10)   19  Intervertebral disc disorders with radiculopathy, lumbar region (724 4) (M51 16)   20  Lumbar stenosis (724 02) (M48 061)   21  Need for pneumococcal vaccination (V03 82) (Z23)   22  Need for pneumococcal vaccine (V03 82) (Z23)   23  Need for shingles vaccine (V04 89) (Z23)   24  Need for Tdap vaccination (V06 1) (Z23)   25  Osteoporosis (733 00) (M81 0)   26  Overweight (278 02) (E66 3)   27  Pain of upper extremity (729 5) (M79 603)   28  Positive FIT (fecal immunochemical test) (792 1) (R19 5)   29  Right hip pain (719 45) (M25 551)   30  Right shoulder pain (719 41) (M25 511)   31  Sinusitis (473 9) (J32 9)   32  Skin lesion (709 9) (L98 9)   33  Stiffness of right shoulder joint (719 51) (M25 611)   34   Tendinitis of right rotator cuff (726 10) (M36 81) 35  Visit for routine gyn exam (V72 31) (Z01 419)   36  Well adult on routine health check (V70 0) (Z00 00)   37  Yeast infection (112 9) (B37 9)    Past Medical History    · History of Screening for thyroid disorder (V77 0) (Z13 29)   · History of UTI symptoms (788 99) (R39 9)    The active problems and past medical history were reviewed and updated today  Surgical History    The surgical history was reviewed and updated today  Family History    · Denied: Family history of Crohn's disease without complication, unspecified  gastrointestinal tract location   · Denied: Family history of colon cancer   · Denied: Family history of liver disease   · Family history of Heart Disease (V17 49)   · Family history of Mother  At Age 80    · Denied: Family history of Crohn's disease without complication, unspecified  gastrointestinal tract location   · Family history of Diabetes Mellitus (V18 0)   · Denied: Family history of colon cancer   · Denied: Family history of liver disease   · Family history of Father  At Age 80   · Family history of Heart Disease (V17 49)    The family history was reviewed and updated today  Social History    · Caffeine Use   · Current tobacco use (305 1) (Z72 0)   · Former smoker (V15 82) (L80 244)   · Never Drank Alcohol   · Use of electronic cigarettes  The social history was reviewed and updated today  The social history was reviewed and is unchanged  Current Meds   1  Alendronate Sodium 70 MG Oral Tablet; take 1 tablet once weekly; Therapy: 84OHK9888 to (Evaluate:2018)  Requested for: 11ZHQ0706; Last   Rx:96Fdw5961 Ordered   2  Amoxicillin-Pot Clavulanate 875-125 MG Oral Tablet; TAKE 1 TABLET EVERY 12 HOURS   DAILY; Therapy: 80SNA8908 to (Evaluate:73Acl4163)  Requested for: 64OYF0937; Last   Rx:37Vcz4879 Ordered   3  Aspirin EC 81 MG Oral Tablet Delayed Release; Take 1 tablet daily Recorded   4   Atorvastatin Calcium 10 MG Oral Tablet; take 1 tablet by mouth every day; Therapy: 14GPP4658 to (Maurliio Reynoso)  Requested for: 07VPH2820; Last   Rx:30Nov2017 Ordered   5  Calcium Plus Vitamin D CAPS; Therapy: (Recorded:01Lni3440) to Recorded   6  Cranberry CAPS; Therapy: (Recorded:48Dja7424) to Recorded   7  [employer] Red Yeast Rice CAPS; TAKE AS DIRECTED; Therapy: (Recorded:75Jwx3063) to Recorded   8  Cyclobenzaprine HCl - 10 MG Oral Tablet; one tablet tid prn; Therapy: 87JEW7265 to (Last Rx:09Oct2017)  Requested for: 01UFD6356; Status: ACTIVE   - Renewal Denied Ordered   9  Fish Oil 1000 MG Oral Capsule; Therapy: (Recorded:75Oyz3412) to Recorded   10  Fluticasone Propionate 50 MCG/ACT Nasal Suspension; USE 2 SPRAYS IN EACH    NOSTRIL ONCE DAILY Recorded   11  Lisinopril 20 MG Oral Tablet; TAKE 1 TABLET DAILY; Therapy: 60KWA6198 to (WHNQNULQ:62RZO6590)  Requested for: 79CGE8012; Last    Rx:93Kyk7875 Ordered   12  LORazepam 1 MG Oral Tablet; One tablet bid prn; Therapy: 76JVF1065 to (Last Rx:09Oct2017) Ordered   13  Multi Vitamin/Minerals TABS; TAKE 1 TABLET DAILY; Therapy: (Recorded:45Rav5261) to Recorded   14  TraMADol HCl - 50 MG Oral Tablet; TAKE 1 TABLET EVERY 6 HOURS WITH FOOD; Therapy: 22ANA2120 to (Clent Cuff)  Requested for: 29TOT0556; Last    Rx:04Nov2016 Ordered   15  Vitamin C CAPS; Therapy: (Recorded:75Odj3676) to Recorded   16  Vitamin E TABS; Therapy: (Recorded:30Jpz9715) to Recorded    The medication list was reviewed and updated today  Allergies    1  No Known Drug Allergies    Vitals   Recorded: 20Dec2017 05:37PM   Temperature 98 3 F   Heart Rate 88   Respiration 14   Systolic 763   Diastolic 66   Height 4 ft 11 in   Weight 148 lb 8 oz   BMI Calculated 29 99   BSA Calculated 1 62   O2 Saturation 97     Physical Exam    Constitutional   General appearance: No acute distress, well appearing and well nourished  Eyes   Conjunctiva and lids: No swelling, erythema or discharge      Pupils and irises: Equal, round and reactive to light  Ears, Nose, Mouth, and Throat   External inspection of ears and nose: Normal     Oropharynx: Normal with no erythema, edema, exudate or lesions  Pulmonary   Respiratory effort: No increased work of breathing or signs of respiratory distress  Auscultation of lungs: Clear to auscultation  Cardiovascular   Palpation of heart: Normal PMI, no thrills  Auscultation of heart: Normal rate and rhythm, normal S1 and S2, without murmurs  Examination of extremities for edema and/or varicosities: Normal     Carotid pulses: Normal     Abdomen   Abdomen: Non-tender, no masses  Liver and spleen: No hepatomegaly or splenomegaly  Lymphatic   Palpation of lymph nodes in neck: No lymphadenopathy  Musculoskeletal   Gait and station: Normal     Digits and nails: Normal without clubbing or cyanosis  Inspection/palpation of joints, bones, and muscles: Normal     Skin   Skin and subcutaneous tissue: Normal without rashes or lesions  Psychiatric   Orientation to person, place, and time: Normal     Mood and affect: Normal          Assessment    1  GERD (gastroesophageal reflux disease) (530 81) (K21 9)   2  Positive FIT (fecal immunochemical test) (792 1) (R19 5)    Plan  GERD (gastroesophageal reflux disease), Positive FIT (fecal immunochemical test)    · EGD; Status:Active; Requested for:29Wyu1167;    Perform:[facility]; Due:32Lwv0447; Last Updated By:Munira Jean-Baptiste; 12/20/2017 5:57:33 PM;Ordered; For:GERD (gastroesophageal reflux disease), Positive FIT (fecal immunochemical test); Ordered By:Valerie Swenson; Positive FIT (fecal immunochemical test)    · Suprep Bowel Prep Kit 17 5-3 13-1 6 GM/180ML Oral Solution; USE AS DIRECTED   Rx By: Richard Leger; Dispense: 0 Days ; #:1 X 177 ML Bottle (2 Bottles);  Refill: 0; For: Positive FIT (fecal immunochemical test); JEAN = N; Verified Transmission to [facility] #[id number] Last Updated By: System, [employer]; 12/20/2017 5:54:11 PM   · COLONOSCOPY (GI, SURG); Status:Active; Requested for:13Itb1788;    Perform:[facility]; Due:62Dzl6601; Last Updated By:Jun Jean-Baptiste; 12/20/2017 5:57:33 PM;Ordered; For:Positive FIT (fecal immunochemical test); Ordered By:Valerie Swenson; Discussion/Summary      ASSESSMENT:    #1  Occult GI bleeding- rule out colorectal lesions including polyps or malignancy  Should also rule out upper GI causes  #2  Gastroesophageal reflux disease    #3  Hypertension, hyperlipidemia    PLAN:    #1  Schedule for both EGD and colonoscopy  #2  Continue pepcid    #3  Patient was explained about the lifestyle and dietary modifications  Advised to avoid fatty foods, chocolates, caffeine, alcohol and any other triggering foods  Avoid eating for at least 3 hours before going to bed  #4  Advised about high-fiber diet    #5  Patient was given instructions about the colonoscopy prep  #6  Patient was explained about the risks and benefits of the procedure  Risks including but not limited to bleeding, infection, perforation were explained in detail  Also explained about less than 100% sensitivity with the exam and other alternatives  The patient was counseled regarding instructions for management, risk factor reductions, patient and family education, risks and benefits of treatment options, importance of compliance with treatment        Future Appointments    Signatures   Electronically signed by : ANTHONY Sorensen ; Dec 20 2017  9:35PM EST                       (Author)

## 2018-01-24 ENCOUNTER — ANESTHESIA (OUTPATIENT)
Dept: PERIOP | Facility: AMBULARY SURGERY CENTER | Age: 63
End: 2018-01-24
Payer: COMMERCIAL

## 2018-01-24 ENCOUNTER — HOSPITAL ENCOUNTER (OUTPATIENT)
Facility: AMBULARY SURGERY CENTER | Age: 63
Setting detail: OUTPATIENT SURGERY
Discharge: HOME | End: 2018-01-24
Attending: INTERNAL MEDICINE | Admitting: INTERNAL MEDICINE
Payer: COMMERCIAL

## 2018-01-24 VITALS
SYSTOLIC BLOOD PRESSURE: 121 MMHG | HEART RATE: 68 BPM | BODY MASS INDEX: 29.43 KG/M2 | HEIGHT: 59 IN | DIASTOLIC BLOOD PRESSURE: 62 MMHG | WEIGHT: 146 LBS | TEMPERATURE: 97.2 F | RESPIRATION RATE: 20 BRPM | OXYGEN SATURATION: 100 %

## 2018-01-24 DIAGNOSIS — K21.9 GERD (GASTROESOPHAGEAL REFLUX DISEASE): ICD-10-CM

## 2018-01-24 DIAGNOSIS — R19.5 POSITIVE FIT (FECAL IMMUNOCHEMICAL TEST): ICD-10-CM

## 2018-01-24 PROCEDURE — 88305 TISSUE EXAM BY PATHOLOGIST: CPT | Performed by: INTERNAL MEDICINE

## 2018-01-24 PROCEDURE — 43239 EGD BIOPSY SINGLE/MULTIPLE: CPT | Performed by: INTERNAL MEDICINE

## 2018-01-24 PROCEDURE — 88342 IMHCHEM/IMCYTCHM 1ST ANTB: CPT | Performed by: PATHOLOGY

## 2018-01-24 PROCEDURE — 88342 IMHCHEM/IMCYTCHM 1ST ANTB: CPT | Performed by: INTERNAL MEDICINE

## 2018-01-24 PROCEDURE — 88305 TISSUE EXAM BY PATHOLOGIST: CPT | Performed by: PATHOLOGY

## 2018-01-24 PROCEDURE — 45385 COLONOSCOPY W/LESION REMOVAL: CPT | Performed by: INTERNAL MEDICINE

## 2018-01-24 RX ORDER — FLUTICASONE PROPIONATE 50 MCG
1 SPRAY, SUSPENSION (ML) NASAL DAILY
COMMUNITY

## 2018-01-24 RX ORDER — PROPOFOL 10 MG/ML
INJECTION, EMULSION INTRAVENOUS AS NEEDED
Status: DISCONTINUED | OUTPATIENT
Start: 2018-01-24 | End: 2018-01-24 | Stop reason: SURG

## 2018-01-24 RX ORDER — TRAMADOL HYDROCHLORIDE 50 MG/1
50 TABLET ORAL EVERY 6 HOURS PRN
COMMUNITY
End: 2018-10-10 | Stop reason: ALTCHOICE

## 2018-01-24 RX ORDER — CYCLOBENZAPRINE HCL 10 MG
10 TABLET ORAL 3 TIMES DAILY PRN
COMMUNITY
End: 2019-10-17 | Stop reason: SDUPTHER

## 2018-01-24 RX ORDER — CHLORAL HYDRATE 500 MG
1000 CAPSULE ORAL DAILY
COMMUNITY

## 2018-01-24 RX ORDER — SODIUM CHLORIDE 9 MG/ML
125 INJECTION, SOLUTION INTRAVENOUS CONTINUOUS
Status: DISCONTINUED | OUTPATIENT
Start: 2018-01-24 | End: 2018-01-24 | Stop reason: HOSPADM

## 2018-01-24 RX ORDER — ASPIRIN 81 MG/1
81 TABLET ORAL DAILY
COMMUNITY

## 2018-01-24 RX ORDER — SODIUM CHLORIDE 9 MG/ML
125 INJECTION, SOLUTION INTRAVENOUS CONTINUOUS
Status: CANCELLED | OUTPATIENT
Start: 2018-01-24

## 2018-01-24 RX ORDER — PROPOFOL 10 MG/ML
INJECTION, EMULSION INTRAVENOUS CONTINUOUS PRN
Status: DISCONTINUED | OUTPATIENT
Start: 2018-01-24 | End: 2018-01-24 | Stop reason: SURG

## 2018-01-24 RX ORDER — RIBOFLAVIN (VITAMIN B2) 100 MG
100 TABLET ORAL DAILY
COMMUNITY

## 2018-01-24 RX ORDER — MULTIVIT-MIN/IRON FUM/FOLIC AC 7.5 MG-4
1 TABLET ORAL DAILY
COMMUNITY

## 2018-01-24 RX ORDER — PANTOPRAZOLE SODIUM 40 MG/1
40 TABLET, DELAYED RELEASE ORAL DAILY
Qty: 30 TABLET | Refills: 0 | Status: SHIPPED | OUTPATIENT
Start: 2018-01-24 | End: 2019-01-15 | Stop reason: SDUPTHER

## 2018-01-24 RX ORDER — LISINOPRIL 20 MG/1
20 TABLET ORAL DAILY
COMMUNITY
End: 2018-06-14 | Stop reason: SDUPTHER

## 2018-01-24 RX ORDER — LORAZEPAM 1 MG/1
0.5 TABLET ORAL EVERY 8 HOURS PRN
COMMUNITY
End: 2018-10-10 | Stop reason: SDUPTHER

## 2018-01-24 RX ORDER — ATORVASTATIN CALCIUM 10 MG/1
10 TABLET, FILM COATED ORAL DAILY
COMMUNITY
End: 2018-05-23 | Stop reason: SDUPTHER

## 2018-01-24 RX ORDER — FAMOTIDINE 20 MG/1
20 TABLET, FILM COATED ORAL 2 TIMES DAILY
COMMUNITY
End: 2018-10-10 | Stop reason: ALTCHOICE

## 2018-01-24 RX ORDER — ALENDRONATE SODIUM 70 MG/1
70 TABLET ORAL
COMMUNITY
End: 2018-07-12 | Stop reason: SDUPTHER

## 2018-01-24 RX ADMIN — SODIUM CHLORIDE: 0.9 INJECTION, SOLUTION INTRAVENOUS at 09:05

## 2018-01-24 RX ADMIN — PROPOFOL 120 MCG/KG/MIN: 10 INJECTION, EMULSION INTRAVENOUS at 09:23

## 2018-01-24 RX ADMIN — PROPOFOL 60 MG: 10 INJECTION, EMULSION INTRAVENOUS at 09:32

## 2018-01-24 RX ADMIN — PROPOFOL 100 MG: 10 INJECTION, EMULSION INTRAVENOUS at 09:22

## 2018-01-24 NOTE — ANESTHESIA POSTPROCEDURE EVALUATION
Post-Op Assessment Note      CV Status:  Stable    Mental Status:  Alert and awake    Hydration Status:  Euvolemic    PONV Controlled:  Controlled    Airway Patency:  Patent    Post Op Vitals Reviewed: Yes          Staff: CRNA           BP     Temp      Pulse     Resp      SpO2

## 2018-01-24 NOTE — ANESTHESIA PREPROCEDURE EVALUATION
Review of Systems/Medical History  Patient summary reviewed  Chart reviewed  No history of anesthetic complications     Cardiovascular   Pulmonary  Negative pulmonary ROS        GI/Hepatic    GERD ,             Endo/Other  History of thyroid disease , hypothyroidism,      GYN       Hematology   Musculoskeletal       Neurology   Psychology   Anxiety,              Physical Exam    Airway    Mallampati score: II  TM Distance: >3 FB  Neck ROM: full     Dental       Cardiovascular      Pulmonary      Other Findings        Anesthesia Plan  ASA Score- 2     Anesthesia Type- IV sedation with anesthesia with ASA Monitors  Additional Monitors:   Airway Plan:         Plan Factors-    Induction- intravenous  Postoperative Plan-     Informed Consent- Anesthetic plan and risks discussed with patient  I personally reviewed this patient with the CRNA  Discussed and agreed on the Anesthesia Plan with the CRNA  Nova Lopez

## 2018-01-24 NOTE — OP NOTE
COLONOSCOPY    PROCEDURE: Colonoscopy/ Polypectomy (Cold Snare)  Polypectomny (Cold Biopsy)    INDICATIONS:  Heme-positive stool    POST-OP DIAGNOSIS: See the impression below    SEDATION: Monitored anesthesia care, check anesthesia records    PHYSICAL EXAM:    /58   Pulse 72   Temp (!) 97 2 °F (36 2 °C) (Temporal)   Resp 20   Ht 4' 11" (1 499 m)   Wt 66 2 kg (146 lb)   SpO2 95%   BMI 29 49 kg/m²   Body mass index is 29 49 kg/m²  General: NAD  Heart: S1 & S2 normal, RRR  Lungs: CTA, No rales or rhonchi  Abdomen: Soft, nontender, nondistended, good bowel sounds    CONSENT:  Informed consent was obtained for the procedure, including sedation after explaining the risks and benefits of the procedure  Risks including but not limited to bleeding, perforation, infection, aspiration were discussed in detail  Also explained about less than 100%$ sensitivity with the exam and other alternatives  PREPARATION:   EKG tracing, pulse oximetry, blood pressure were monitored throughout the procedure  Patient was identified by myself both verbally and by visual inspection of ID band  DESCRIPTION:   Patient was placed in the left lateral decubitus position and was sedated with the above medication  Digital rectal examination was performed  The colonoscope was introduced in to the anal canal and advanced up to cecum, which was identified by the appendiceal orifice and IC valve  A careful inspection was made as the colonoscope was withdrawn, including a retroflexed view of the rectum; findings and interventions are described below  Appropriate photodocumentation was obtained  The quality of the colonic preparation was adequate  FINDINGS:    1  Cecum and ileocecal valve-normal mucosa    2  Sigmoid colon-diverticuli seen    3  Rectum and anal canal-couple of diminutive polyps were removed with cold biopsy forceps  5 mm polyp was removed with cold snare           IMPRESSIONS:      As above    RECOMMENDATIONS:    Check pathology    COMPLICATIONS:  None; patient tolerated the procedure well      DISPOSITION: PACU           CONDITION: Stable

## 2018-01-24 NOTE — OP NOTE
ESOPHAGOGASTRODUODENOSCOPY    PROCEDURE: EGD/ Biopsy    INDICATIONS: GERD and Heme positive stool    POST-OP DIAGNOSIS: See the impression below    SEDATION: Monitored anesthesia care, check anesthesia records    PHYSICAL EXAM:    Vitals:    01/24/18 0946   BP: 104/58   Pulse: 72   Resp: 20   Temp: (!) 97 2 °F (36 2 °C)   SpO2: 95%    Body mass index is 29 49 kg/m²  General: NAD  Heart: S1 & S2 normal, RRR  Lungs: CTA, No rales or rhonchi  Abdomen: Soft, nontender, nondistended, good bowel sounds    CONSENT:  Informed consent was obtained for the procedure, including sedation after explaining the risks and benefits of the procedure  Risks including but not limited to bleeding, perforation, infection, aspiration were discussed in detail  Also explained about less than 100% sensitivity with the exam and other alternatives  PREPARATION:   EKG tracing, pulse oximetry, blood pressure were monitored throughout the procedure  Patient was identified by myself both verbally and by visual inspection of ID band  DESCRIPTION:   Patient was placed in the left lateral decubitus position and was sedated with the above medication  The gastroscope was introduced in to the oropharynx and the esophagus was intubated under direct visualization  Scope was passed down the esophagus up to 2nd part of the duodenum  A careful inspection was made as the gastroscope was withdrawn, including a retroflexed view of the stomach; findings and interventions are described below  FINDINGS:    #1  Esophagus and GEJ-1-2 cm hiatal hernia was noted with small ulceration at the GE junction    #2  Stomach-gastritis was noted with few erosions in the antrum  Biopsies done to check for H pylori  #3  Duodenum-normal         IMPRESSIONS:      As above    RECOMMENDATIONS:     Check pathology    Put her on pantoprazole regularly    COMPLICATIONS:  None; patient tolerated the procedure well            DISPOSITION: PACU           CONDITION: Stable

## 2018-01-24 NOTE — DISCHARGE INSTRUCTIONS
Colorectal Polyps   WHAT YOU NEED TO KNOW:   Colorectal polyps are small growths of tissue in the lining of the colon and rectum  Most polyps are hyperplastic polyps and are usually benign (noncancerous)  Certain types of polyps, called adenomatous polyps, may turn into cancer  DISCHARGE INSTRUCTIONS:   Follow up with your healthcare provider or gastroenterologist as directed: You may need to return for more tests, such as another colonoscopy  Write down your questions so you remember to ask them during your visits  Reduce your risk for colorectal polyps:   · Eat a variety of healthy foods:  Healthy foods include fruit, vegetables, whole-grain breads, low-fat dairy products, beans, lean meat, and fish  Ask if you need to be on a special diet  · Maintain a healthy weight:  Ask your healthcare provider if you need to lose weight and how much you need to lose  Ask for help with a weight loss program     · Exercise:  Begin to exercise slowly and do more as you get stronger  Talk with your healthcare provider before you start an exercise program      · Limit alcohol:  Your risk for polyps increases the more you drink  · Do not smoke: If you smoke, it is never too late to quit  Ask for information about how to stop  For support and more information:   · [facility] ([facility]) [address], [place] [address]  Phone: 3- [phone] - [phone]  Web Address: www digestive  niddk nih gov  Contact your healthcare provider or gastroenterologist if:   · You have a fever  · You have chills, a cough, or feel weak and achy  · You have abdominal pain that does not go away or gets worse after you take medicine  · Your abdomen is swollen  · You are losing weight without trying  · You have questions or concerns about your condition or care  Seek care immediately or call 911 if:   · You have sudden shortness of breath       · You have a fast heart rate, fast breathing, or are too dizzy to stand up  · You have severe abdominal pain  · You see blood in your bowel movement  © 2017 [address] Information is for End User's use only and may not be sold, redistributed or otherwise used for commercial purposes  All illustrations and images included in CareNotes® are the copyrighted property of [employer] D [employer] M , Inc  or [address]  The above information is an  only  It is not intended as medical advice for individual conditions or treatments  Talk to your doctor, nurse or pharmacist before following any medical regimen to see if it is safe and effective for you  Colonoscopy   WHAT YOU NEED TO KNOW:   A colonoscopy is a procedure to examine the inside of your colon (intestine) with a scope  Polyps or tissue growths may have been removed during your colonoscopy  It is normal to feel bloated and to have some abdominal discomfort  You should be passing gas  If you have hemorrhoids or you had polyps removed, you may have a small amount of bleeding  DISCHARGE INSTRUCTIONS:   Seek care immediately if:   · You have a large amount of bright red blood in your bowel movements  · Your abdomen is hard and firm and you have severe pain  · You have sudden trouble breathing  Contact your healthcare provider if:   · You develop a rash or hives  · You have a fever within 24 hours of your procedure       · You have not had a bowel movement for 3 days after your procedure  · You have questions or concerns about your condition or care  Activity:   · Do not lift, strain, or run  for 3 days after your procedure  · Rest after your procedure  You have been given medicine to relax you  Do not  drive or make important decisions until the day after your procedure  Return to your normal activity as directed  · Relieve gas and discomfort from bloating  by lying on your right side with a heating pad on your abdomen   You may need to take short walks to help the gas move out  Eat small meals until bloating is relieved  If you had polyps removed: For 7 days after your procedure:  · Do not  take aspirin  · Do not  go on long car rides  Follow up with your healthcare provider as directed:  Write down your questions so you remember to ask them during your visits  © 2017 [address] is for End User's use only and may not be sold, redistributed or otherwise used for commercial purposes  All illustrations and images included in CareNotes® are the copyrighted property of [employer] M [employer] Inc  or Saroj Bliss  The above information is an  only  It is not intended as medical advice for individual conditions or treatments  Talk to your doctor, nurse or pharmacist before following any medical regimen to see if it is safe and effective for you  Upper Endoscopy   WHAT YOU NEED TO KNOW:   An upper endoscopy is also called an upper gastrointestinal (GI) endoscopy, or an esophagogastroduodenoscopy (EGD)  You may feel bloated, gassy, or have some abdominal discomfort after your procedure  Your throat may be sore for 24 to 36 hours  You may burp or pass gas from air that is still inside your body  DISCHARGE INSTRUCTIONS:   Call 911 for any of the following:   · You have sudden chest pain or trouble breathing  Seek care immediately if:   · You feel dizzy or faint  · You have trouble swallowing  · Your bowel movements are very dark or black  · Your abdomen is hard and firm and you have severe pain  · You vomit blood  Contact your healthcare provider if:   · You feel full or bloated and cannot burp or pass gas  · You have not had a bowel movement for 3 days after your procedure  · You have neck pain  · You have a fever or chills  · You have nausea or are vomiting  · You have a rash or hives  · You have questions or concerns about your endoscopy    Relieve a sore throat:  Suck on throat lozenges or crushed ice  Gargle with a small amount of warm salt water  Mix 1 teaspoon of salt and 1 cup of warm water to make salt water  Relieve gas and discomfort from bloating:  Lie on your right side with a heating pad on your abdomen  Take short walks to help pass gas  Eat small meals until bloating is relieved  Rest after your procedure: You have been given medicine to relax you  Do not  drive or make important decisions until the day after your procedure  Return to your normal activity as directed  You can usually return to work the day after your procedure  Follow up with your healthcare provider as directed:  Write down your questions so you remember to ask them during your visits  © 2017 [address] is for End User's use only and may not be sold, redistributed or otherwise used for commercial purposes  All illustrations and images included in CareNotes® are the copyrighted property of [employer]  or Saroj Bliss  The above information is an  only  It is not intended as medical advice for individual conditions or treatments  Talk to your doctor, nurse or pharmacist before following any medical regimen to see if it is safe and effective for you

## 2018-01-24 NOTE — H&P
History and Physical - [facility]  Kylee Jara 57 y o  female MRN: [id number]        HPI: 57year-old female with history of hypertension, hyperlipidemia was noted to have heme-positive stool on fecal occult blood testing  Patient has regular bowel movements and denies any blood or mucus in the stool  Appetite is good and denies any recent weight loss  Denies any abdominal pain, nausea, or vomiting  She has problems with acid reflux for which she takes Pepcid with help  Denies any difficulty swallowing  never had colonoscopy in the past    she is on antibiotics for sinusitis     Historical Information   Past Medical History:   Diagnosis Date    GERD (gastroesophageal reflux disease)     Hyperlipidemia     Hypertension     UTI (urinary tract infection)      History reviewed  No pertinent surgical history  Social History   History   Alcohol Use    0 6 oz/week    1 Glasses of wine per week     Comment: per month     History   Drug use: Unknown     History   Smoking Status    Heavy Tobacco Smoker    Packs/day: 1 50   Smokeless Tobacco    Current User     History reviewed  No pertinent family history      Meds/Allergies     Prescriptions Prior to Admission   Medication    alendronate (FOSAMAX) 70 mg tablet    Ascorbic Acid (VITAMIN C) 100 MG tablet    aspirin (ECOTRIN LOW STRENGTH) 81 mg EC tablet    atorvastatin (LIPITOR) 10 mg tablet    calcium-vitamin D 250-100 MG-UNIT per tablet    Cranberry 1000 MG CAPS    famotidine (PEPCID) 20 mg tablet    fluticasone (FLONASE) 50 mcg/act nasal spray    lisinopril (ZESTRIL) 20 mg tablet    LORazepam (ATIVAN) 1 mg tablet    Multiple Vitamins-Minerals (MULTIVITAMIN WITH MINERALS) tablet    Omega-3 Fatty Acids (FISH OIL) 1,000 mg    vitamin E 100 UNIT capsule    cyclobenzaprine (FLEXERIL) 10 mg tablet    traMADol (ULTRAM) 50 mg tablet       No Known Allergies    Objective     Blood pressure (!) 178/94, pulse 90, temperature 97 5 °F (36 4 °C), temperature source Temporal, resp  rate 20, height 4' 11" (1 499 m), weight 66 2 kg (146 lb), SpO2 99 %      PHYSICAL EXAM:    Gen: NAD  CV: S1 & S2 normal, RRR  CHEST: Clear to auscultate  ABD: soft, NT/ND, good bowel sounds  EXT: no edema    ASSESSMENT:     GERD, heme-positive stool    PLAN:    EGD and colonoscopy

## 2018-01-26 ENCOUNTER — TELEPHONE (OUTPATIENT)
Dept: GASTROENTEROLOGY | Facility: CLINIC | Age: 63
End: 2018-01-26

## 2018-01-26 ENCOUNTER — TELEPHONE (OUTPATIENT)
Dept: GASTROENTEROLOGY | Facility: AMBULARY SURGERY CENTER | Age: 63
End: 2018-01-26

## 2018-01-29 ENCOUNTER — TELEPHONE (OUTPATIENT)
Dept: GASTROENTEROLOGY | Facility: AMBULARY SURGERY CENTER | Age: 63
End: 2018-01-29

## 2018-03-13 ENCOUNTER — TELEPHONE (OUTPATIENT)
Dept: GASTROENTEROLOGY | Facility: AMBULARY SURGERY CENTER | Age: 63
End: 2018-03-13

## 2018-03-13 NOTE — TELEPHONE ENCOUNTER
----- Message from Nette Morales MA sent at 3/13/2018 10:02 AM EDT -----  Regarding: FW: 10 yr colon recall      ----- Message -----  From: Elinor Chaudhary  Sent: 1/31/2018   3:22 PM  To: Gastroenterology Surgery Coordinator  Subject: 10 yr colon recall                               Pt needs 10yr colon screening

## 2018-05-23 DIAGNOSIS — E78.5 DYSLIPIDEMIA: Primary | ICD-10-CM

## 2018-05-23 RX ORDER — ATORVASTATIN CALCIUM 10 MG/1
TABLET, FILM COATED ORAL
Qty: 90 TABLET | Refills: 1 | Status: SHIPPED | OUTPATIENT
Start: 2018-05-23 | End: 2019-02-03 | Stop reason: SDUPTHER

## 2018-06-14 DIAGNOSIS — I10 ESSENTIAL HYPERTENSION: Primary | ICD-10-CM

## 2018-06-14 RX ORDER — LISINOPRIL 20 MG/1
TABLET ORAL
Qty: 90 TABLET | Refills: 1 | Status: SHIPPED | OUTPATIENT
Start: 2018-06-14 | End: 2018-12-05 | Stop reason: SDUPTHER

## 2018-07-10 ENCOUNTER — OFFICE VISIT (OUTPATIENT)
Dept: FAMILY MEDICINE CLINIC | Facility: MEDICAL CENTER | Age: 63
End: 2018-07-10
Payer: MEDICARE

## 2018-07-10 VITALS
BODY MASS INDEX: 30.12 KG/M2 | RESPIRATION RATE: 16 BRPM | HEIGHT: 59 IN | HEART RATE: 70 BPM | SYSTOLIC BLOOD PRESSURE: 144 MMHG | DIASTOLIC BLOOD PRESSURE: 80 MMHG | TEMPERATURE: 99 F | WEIGHT: 149.38 LBS

## 2018-07-10 DIAGNOSIS — R51.9 RIGHT SIDED FACIAL PAIN: Primary | ICD-10-CM

## 2018-07-10 PROCEDURE — 99213 OFFICE O/P EST LOW 20 MIN: CPT | Performed by: FAMILY MEDICINE

## 2018-07-10 PROCEDURE — 3008F BODY MASS INDEX DOCD: CPT | Performed by: FAMILY MEDICINE

## 2018-07-10 RX ORDER — AMOXICILLIN 500 MG/1
500 CAPSULE ORAL EVERY 8 HOURS SCHEDULED
Qty: 21 CAPSULE | Refills: 0 | Status: SHIPPED | OUTPATIENT
Start: 2018-07-10 | End: 2018-07-17

## 2018-07-10 NOTE — PROGRESS NOTES
Assessment/Plan:    No problem-specific Assessment & Plan notes found for this encounter  Diagnoses and all orders for this visit:    Right sided facial pain  -     amoxicillin (AMOXIL) 500 mg capsule; Take 1 capsule (500 mg total) by mouth every 8 (eight) hours for 7 days  Etiology of the facial pain unclear however the fullness that was felt on exam along with the slight elevation in temp leads me to believe there may be an infectious process occurring  I recommended patient start an antibiotic with amoxicillin  Patient was agreeable  Will have her take amoxicillin 500 mg 3 times daily for seven days  Follow-up in one week if symptoms persist or sooner if needed  Subjective:      Patient ID: Evelena Phalen is a 70 y o  female  Patient presents with right-sided ear pain for the past five days  No recent trauma  No redness  No swelling  No trouble hearing  Patient has not been swimming lately  She did have mild increase in temperature to a T-max of 99 4°  The following portions of the patient's history were reviewed and updated as appropriate: allergies and current medications  Review of Systems   Constitutional: Negative for fever  Respiratory: Negative for shortness of breath  Cardiovascular: Negative for chest pain  Objective:      /80   Pulse 70   Temp 99 °F (37 2 °C) (Oral)   Resp 16   Ht 4' 11" (1 499 m)   Wt 67 8 kg (149 lb 6 oz)   BMI 30 17 kg/m²          Physical Exam   Constitutional: She appears well-developed and well-nourished  HENT:   Head: Normocephalic and atraumatic         Right Ear: Tympanic membrane, external ear and ear canal normal    Left Ear: Tympanic membrane, external ear and ear canal normal    Mouth/Throat: Uvula is midline, oropharynx is clear and moist and mucous membranes are normal

## 2018-07-12 DIAGNOSIS — M81.0 AGE-RELATED OSTEOPOROSIS WITHOUT CURRENT PATHOLOGICAL FRACTURE: Primary | ICD-10-CM

## 2018-07-13 RX ORDER — ALENDRONATE SODIUM 70 MG/1
TABLET ORAL
Qty: 4 TABLET | Refills: 6 | Status: SHIPPED | OUTPATIENT
Start: 2018-07-13 | End: 2019-04-10

## 2018-10-10 ENCOUNTER — OFFICE VISIT (OUTPATIENT)
Dept: FAMILY MEDICINE CLINIC | Facility: MEDICAL CENTER | Age: 63
End: 2018-10-10
Payer: COMMERCIAL

## 2018-10-10 VITALS
WEIGHT: 147 LBS | SYSTOLIC BLOOD PRESSURE: 144 MMHG | BODY MASS INDEX: 29.64 KG/M2 | RESPIRATION RATE: 16 BRPM | DIASTOLIC BLOOD PRESSURE: 70 MMHG | HEART RATE: 68 BPM | HEIGHT: 59 IN

## 2018-10-10 DIAGNOSIS — E78.00 PURE HYPERCHOLESTEROLEMIA: ICD-10-CM

## 2018-10-10 DIAGNOSIS — I10 ESSENTIAL HYPERTENSION: ICD-10-CM

## 2018-10-10 DIAGNOSIS — Z01.419 ENCOUNTER FOR GYNECOLOGICAL EXAMINATION WITHOUT ABNORMAL FINDING: ICD-10-CM

## 2018-10-10 DIAGNOSIS — Z23 NEED FOR IMMUNIZATION AGAINST INFLUENZA: ICD-10-CM

## 2018-10-10 DIAGNOSIS — Z12.39 SCREENING FOR BREAST CANCER: Primary | ICD-10-CM

## 2018-10-10 DIAGNOSIS — F41.9 ANXIETY: Primary | ICD-10-CM

## 2018-10-10 DIAGNOSIS — E78.5 DYSLIPIDEMIA: ICD-10-CM

## 2018-10-10 DIAGNOSIS — Z12.2 ENCOUNTER FOR SCREENING FOR LUNG CANCER: ICD-10-CM

## 2018-10-10 DIAGNOSIS — Z00.00 ROUTINE ADULT HEALTH MAINTENANCE: ICD-10-CM

## 2018-10-10 DIAGNOSIS — M81.0 AGE-RELATED OSTEOPOROSIS WITHOUT CURRENT PATHOLOGICAL FRACTURE: ICD-10-CM

## 2018-10-10 PROCEDURE — 87624 HPV HI-RISK TYP POOLED RSLT: CPT | Performed by: FAMILY MEDICINE

## 2018-10-10 PROCEDURE — G0008 ADMIN INFLUENZA VIRUS VAC: HCPCS | Performed by: FAMILY MEDICINE

## 2018-10-10 PROCEDURE — 99387 INIT PM E/M NEW PAT 65+ YRS: CPT | Performed by: FAMILY MEDICINE

## 2018-10-10 PROCEDURE — 90662 IIV NO PRSV INCREASED AG IM: CPT | Performed by: FAMILY MEDICINE

## 2018-10-10 PROCEDURE — G0145 SCR C/V CYTO,THINLAYER,RESCR: HCPCS | Performed by: FAMILY MEDICINE

## 2018-10-10 RX ORDER — LORAZEPAM 1 MG/1
0.5 TABLET ORAL EVERY 8 HOURS PRN
Qty: 30 TABLET | Refills: 0 | Status: SHIPPED | OUTPATIENT
Start: 2018-10-10 | End: 2019-10-17 | Stop reason: SDUPTHER

## 2018-10-10 NOTE — PROGRESS NOTES
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; stocking shelves  FH: No CAD, stroke, cancer  Colonoscopy 2018  10 year   Mammogram 2017  Dexa scan 2017     Gyn   No vagina bleeding  discharge, burning  No incontinence  Sexually active  No breast complaints    Assessment  1  Health maintenance-immunizations are up-to-date  Discussed Shingrix  Flu shot today  2    Recent smoking cessation-will get annual CT lung cancer screening  Encouraged continued cessation and weaning from nicotine gum  3  Hypertension-controlled with lisinopril  4  Osteoporosis-has had improvement with biphosphonates  However she is now 5 years therapy and will need to discontinue  Will see if her insurance will cover a DEXA scan this year  Based on last year's DEXA scan therapy with Prolia would not be indicated in light of normal FRAX scores  Smoking cessation will also be helpful here  Otherwise she should continue her calcium and vitamin-D supplement  5  Dyslipidemia-due for blood work  6  Gyn exam-normal  7  Anxiety-uses lorazemapam occ  Needs refill    Plan  Check DEXA scan, mammogram, CT lung cancer screen  Check blood work  Flu shot today  Discussed Shingrix

## 2018-10-12 LAB
HPV HR 12 DNA CVX QL NAA+PROBE: NEGATIVE
HPV16 DNA CVX QL NAA+PROBE: NEGATIVE
HPV18 DNA CVX QL NAA+PROBE: NEGATIVE

## 2018-10-15 ENCOUNTER — APPOINTMENT (OUTPATIENT)
Dept: LAB | Facility: MEDICAL CENTER | Age: 63
End: 2018-10-15
Payer: COMMERCIAL

## 2018-10-15 DIAGNOSIS — Z12.2 ENCOUNTER FOR SCREENING FOR LUNG CANCER: ICD-10-CM

## 2018-10-15 DIAGNOSIS — E78.5 DYSLIPIDEMIA: ICD-10-CM

## 2018-10-15 DIAGNOSIS — M81.0 AGE-RELATED OSTEOPOROSIS WITHOUT CURRENT PATHOLOGICAL FRACTURE: ICD-10-CM

## 2018-10-15 DIAGNOSIS — Z00.00 ROUTINE ADULT HEALTH MAINTENANCE: ICD-10-CM

## 2018-10-15 DIAGNOSIS — I10 ESSENTIAL HYPERTENSION: ICD-10-CM

## 2018-10-15 DIAGNOSIS — Z12.39 SCREENING FOR BREAST CANCER: ICD-10-CM

## 2018-10-15 DIAGNOSIS — Z01.419 ENCOUNTER FOR GYNECOLOGICAL EXAMINATION WITHOUT ABNORMAL FINDING: ICD-10-CM

## 2018-10-15 LAB
ALBUMIN SERPL BCP-MCNC: 3.3 G/DL (ref 3.5–5)
ALP SERPL-CCNC: 55 U/L (ref 46–116)
ALT SERPL W P-5'-P-CCNC: 26 U/L (ref 12–78)
ANION GAP SERPL CALCULATED.3IONS-SCNC: 7 MMOL/L (ref 4–13)
AST SERPL W P-5'-P-CCNC: 17 U/L (ref 5–45)
BILIRUB SERPL-MCNC: 0.3 MG/DL (ref 0.2–1)
BUN SERPL-MCNC: 15 MG/DL (ref 5–25)
CALCIUM SERPL-MCNC: 8.6 MG/DL (ref 8.3–10.1)
CHLORIDE SERPL-SCNC: 105 MMOL/L (ref 100–108)
CHOLEST SERPL-MCNC: 152 MG/DL (ref 50–200)
CO2 SERPL-SCNC: 25 MMOL/L (ref 21–32)
CREAT SERPL-MCNC: 0.58 MG/DL (ref 0.6–1.3)
ERYTHROCYTE [DISTWIDTH] IN BLOOD BY AUTOMATED COUNT: 12.6 % (ref 11.6–15.1)
EST. AVERAGE GLUCOSE BLD GHB EST-MCNC: 123 MG/DL
GFR SERPL CREATININE-BSD FRML MDRD: 97 ML/MIN/1.73SQ M
GLUCOSE P FAST SERPL-MCNC: 99 MG/DL (ref 65–99)
HBA1C MFR BLD: 5.9 % (ref 4.2–6.3)
HCT VFR BLD AUTO: 36.5 % (ref 34.8–46.1)
HDLC SERPL-MCNC: 62 MG/DL (ref 40–60)
HGB BLD-MCNC: 12.2 G/DL (ref 11.5–15.4)
LAB AP GYN PRIMARY INTERPRETATION: NORMAL
LDLC SERPL CALC-MCNC: 78 MG/DL (ref 0–100)
Lab: NORMAL
MCH RBC QN AUTO: 30.5 PG (ref 26.8–34.3)
MCHC RBC AUTO-ENTMCNC: 33.4 G/DL (ref 31.4–37.4)
MCV RBC AUTO: 91 FL (ref 82–98)
NONHDLC SERPL-MCNC: 90 MG/DL
PLATELET # BLD AUTO: 258 THOUSANDS/UL (ref 149–390)
PMV BLD AUTO: 10.6 FL (ref 8.9–12.7)
POTASSIUM SERPL-SCNC: 3.7 MMOL/L (ref 3.5–5.3)
PROT SERPL-MCNC: 6.8 G/DL (ref 6.4–8.2)
RBC # BLD AUTO: 4 MILLION/UL (ref 3.81–5.12)
SODIUM SERPL-SCNC: 137 MMOL/L (ref 136–145)
TRIGL SERPL-MCNC: 60 MG/DL
TSH SERPL DL<=0.05 MIU/L-ACNC: 0.84 UIU/ML (ref 0.36–3.74)
WBC # BLD AUTO: 5.01 THOUSAND/UL (ref 4.31–10.16)

## 2018-10-15 PROCEDURE — 80053 COMPREHEN METABOLIC PANEL: CPT

## 2018-10-15 PROCEDURE — 85027 COMPLETE CBC AUTOMATED: CPT

## 2018-10-15 PROCEDURE — 83036 HEMOGLOBIN GLYCOSYLATED A1C: CPT

## 2018-10-15 PROCEDURE — 84443 ASSAY THYROID STIM HORMONE: CPT

## 2018-10-15 PROCEDURE — 80061 LIPID PANEL: CPT

## 2018-10-15 PROCEDURE — 36415 COLL VENOUS BLD VENIPUNCTURE: CPT

## 2018-11-09 ENCOUNTER — HOSPITAL ENCOUNTER (OUTPATIENT)
Dept: RADIOLOGY | Facility: MEDICAL CENTER | Age: 63
Discharge: HOME | End: 2018-11-09
Payer: COMMERCIAL

## 2018-11-09 DIAGNOSIS — Z12.2 ENCOUNTER FOR SCREENING FOR LUNG CANCER: ICD-10-CM

## 2018-11-09 DIAGNOSIS — M81.0 AGE-RELATED OSTEOPOROSIS WITHOUT CURRENT PATHOLOGICAL FRACTURE: ICD-10-CM

## 2018-11-09 DIAGNOSIS — I10 ESSENTIAL HYPERTENSION: ICD-10-CM

## 2018-11-09 DIAGNOSIS — E78.5 DYSLIPIDEMIA: ICD-10-CM

## 2018-11-09 DIAGNOSIS — Z01.419 ENCOUNTER FOR GYNECOLOGICAL EXAMINATION WITHOUT ABNORMAL FINDING: ICD-10-CM

## 2018-11-09 DIAGNOSIS — Z12.39 SCREENING FOR BREAST CANCER: ICD-10-CM

## 2018-11-09 DIAGNOSIS — Z00.00 ROUTINE ADULT HEALTH MAINTENANCE: ICD-10-CM

## 2018-11-09 PROCEDURE — 77063 BREAST TOMOSYNTHESIS BI: CPT

## 2018-11-09 PROCEDURE — 77067 SCR MAMMO BI INCL CAD: CPT

## 2018-11-09 PROCEDURE — 77080 DXA BONE DENSITY AXIAL: CPT

## 2018-11-12 ENCOUNTER — TELEPHONE (OUTPATIENT)
Dept: FAMILY MEDICINE CLINIC | Facility: MEDICAL CENTER | Age: 63
End: 2018-11-12

## 2018-11-12 NOTE — TELEPHONE ENCOUNTER
----- Message from Divina Rangel MD sent at 11/11/2018  9:39 PM EST -----  Notify CT lung cancer screening is stable  Right-sided pulmonary nodules are stable  Repeat 1 year 
Aware 
SONIA for a return call
Nica Herrera

## 2018-11-20 ENCOUNTER — HOSPITAL ENCOUNTER (OUTPATIENT)
Dept: MAMMOGRAPHY | Facility: CLINIC | Age: 63
Discharge: HOME | End: 2018-11-20
Payer: COMMERCIAL

## 2018-11-20 ENCOUNTER — HOSPITAL ENCOUNTER (OUTPATIENT)
Dept: ULTRASOUND IMAGING | Facility: CLINIC | Age: 63
Discharge: HOME | End: 2018-11-20
Payer: COMMERCIAL

## 2018-11-20 VITALS — BODY MASS INDEX: 29.64 KG/M2 | HEIGHT: 59 IN | WEIGHT: 147 LBS

## 2018-11-20 DIAGNOSIS — R92.8 ABNORMAL MAMMOGRAM: ICD-10-CM

## 2018-11-20 PROCEDURE — G0279 TOMOSYNTHESIS, MAMMO: HCPCS

## 2018-11-20 PROCEDURE — 77065 DX MAMMO INCL CAD UNI: CPT

## 2018-11-20 PROCEDURE — 76642 ULTRASOUND BREAST LIMITED: CPT

## 2018-12-05 DIAGNOSIS — I10 ESSENTIAL HYPERTENSION: ICD-10-CM

## 2018-12-06 RX ORDER — LISINOPRIL 20 MG/1
TABLET ORAL
Qty: 90 TABLET | Refills: 1 | Status: SHIPPED | OUTPATIENT
Start: 2018-12-06 | End: 2019-05-24 | Stop reason: SDUPTHER

## 2018-12-12 ENCOUNTER — TELEPHONE (OUTPATIENT)
Dept: FAMILY MEDICINE CLINIC | Facility: MEDICAL CENTER | Age: 63
End: 2018-12-12

## 2018-12-17 NOTE — TELEPHONE ENCOUNTER
Pt called again looking for dexa results, it looks like they're in the chart now, done back in November, 2018

## 2018-12-17 NOTE — TELEPHONE ENCOUNTER
Her last Dexa in November 2017 said another one due for 18-24 months  Not due in 12 months  Where did she go ?

## 2018-12-17 NOTE — TELEPHONE ENCOUNTER
I called the reading room and it looks like it was done last month in the [address] [place] [address] facility  They will look into why it was not finalized and get back to us

## 2018-12-18 NOTE — TELEPHONE ENCOUNTER
Tell her sorry for the delay  I do not know why the report was not showing up  Results are very good  Bone density is unchanged from last year  There is no osteoporosis  Medication is not indicated  Would continue to follow this  Would continue weight-bearing exercise, calcium supplementation, and avoidance of smoking as we discussed  Would repeat in 2 years    Can discuss further at her next appointment

## 2019-01-10 ENCOUNTER — TELEPHONE (OUTPATIENT)
Dept: GASTROENTEROLOGY | Facility: CLINIC | Age: 64
End: 2019-01-10

## 2019-01-10 NOTE — TELEPHONE ENCOUNTER
Please advise, patient can continue taking the medication if she is still having reflux issues or indigestion, but if she is doing well, she can try taking the medication every other day for 3-4 weeks and see how she does  If she does well with this, she can continue tapering until she no longer is requiring the medication  If she has difficulty with this, I would recommend resuming the medication once daily and pursuing office follow-up    Thank you

## 2019-01-10 NOTE — TELEPHONE ENCOUNTER
Jann Medina PATIENT      She would like to know if she needs to continue taking pantoprazole, if so, she will need a refill  I notified her that she will need an office visit as well since it has been a year

## 2019-01-15 ENCOUNTER — TELEPHONE (OUTPATIENT)
Dept: GASTROENTEROLOGY | Facility: AMBULARY SURGERY CENTER | Age: 64
End: 2019-01-15

## 2019-01-15 DIAGNOSIS — K21.9 GASTROESOPHAGEAL REFLUX DISEASE, ESOPHAGITIS PRESENCE NOT SPECIFIED: ICD-10-CM

## 2019-01-15 RX ORDER — PANTOPRAZOLE SODIUM 40 MG/1
40 TABLET, DELAYED RELEASE ORAL DAILY
Qty: 30 TABLET | Refills: 2 | Status: SHIPPED | OUTPATIENT
Start: 2019-01-15 | End: 2019-01-16 | Stop reason: SDUPTHER

## 2019-01-15 RX ORDER — PANTOPRAZOLE SODIUM 40 MG/1
40 TABLET, DELAYED RELEASE ORAL DAILY
Qty: 30 TABLET | Refills: 0 | Status: CANCELLED | OUTPATIENT
Start: 2019-01-15

## 2019-01-15 RX ORDER — PANTOPRAZOLE SODIUM 40 MG/1
40 TABLET, DELAYED RELEASE ORAL DAILY
Qty: 30 TABLET | Refills: 2 | Status: SHIPPED | OUTPATIENT
Start: 2019-01-15 | End: 2019-01-15 | Stop reason: SDUPTHER

## 2019-01-16 DIAGNOSIS — K21.9 GASTROESOPHAGEAL REFLUX DISEASE, ESOPHAGITIS PRESENCE NOT SPECIFIED: ICD-10-CM

## 2019-01-16 RX ORDER — PANTOPRAZOLE SODIUM 40 MG/1
40 TABLET, DELAYED RELEASE ORAL DAILY
Qty: 30 TABLET | Refills: 2 | Status: SHIPPED | OUTPATIENT
Start: 2019-01-16 | End: 2019-04-08 | Stop reason: SDUPTHER

## 2019-01-16 NOTE — TELEPHONE ENCOUNTER
Please call the script for pantoprazole in to [facility] pharmacy, the still have not received   [phone]

## 2019-01-16 NOTE — TELEPHONE ENCOUNTER
Please re send rx for pantoprazole   The order said print at the bottom and [facility] didn't receive the rx yet, thank you

## 2019-02-03 DIAGNOSIS — E78.5 DYSLIPIDEMIA: ICD-10-CM

## 2019-02-03 RX ORDER — ATORVASTATIN CALCIUM 10 MG/1
TABLET, FILM COATED ORAL
Qty: 90 TABLET | Refills: 0 | Status: SHIPPED | OUTPATIENT
Start: 2019-02-03 | End: 2019-05-04 | Stop reason: SDUPTHER

## 2019-04-08 DIAGNOSIS — K21.9 GASTROESOPHAGEAL REFLUX DISEASE, ESOPHAGITIS PRESENCE NOT SPECIFIED: ICD-10-CM

## 2019-04-08 RX ORDER — PANTOPRAZOLE SODIUM 40 MG/1
TABLET, DELAYED RELEASE ORAL
Qty: 30 TABLET | Refills: 2 | Status: SHIPPED | OUTPATIENT
Start: 2019-04-08 | End: 2019-07-01 | Stop reason: SDUPTHER

## 2019-04-10 ENCOUNTER — OFFICE VISIT (OUTPATIENT)
Dept: GASTROENTEROLOGY | Facility: AMBULARY SURGERY CENTER | Age: 64
End: 2019-04-10
Payer: COMMERCIAL

## 2019-04-10 VITALS
DIASTOLIC BLOOD PRESSURE: 76 MMHG | SYSTOLIC BLOOD PRESSURE: 118 MMHG | WEIGHT: 150 LBS | HEIGHT: 59 IN | BODY MASS INDEX: 30.24 KG/M2 | TEMPERATURE: 98 F

## 2019-04-10 DIAGNOSIS — K21.9 GASTROESOPHAGEAL REFLUX DISEASE, ESOPHAGITIS PRESENCE NOT SPECIFIED: Primary | ICD-10-CM

## 2019-04-10 PROCEDURE — 99213 OFFICE O/P EST LOW 20 MIN: CPT | Performed by: PHYSICIAN ASSISTANT

## 2019-05-04 DIAGNOSIS — E78.5 DYSLIPIDEMIA: ICD-10-CM

## 2019-05-05 RX ORDER — ATORVASTATIN CALCIUM 10 MG/1
TABLET, FILM COATED ORAL
Qty: 90 TABLET | Refills: 0 | Status: SHIPPED | OUTPATIENT
Start: 2019-05-05 | End: 2019-08-01 | Stop reason: SDUPTHER

## 2019-05-24 DIAGNOSIS — I10 ESSENTIAL HYPERTENSION: ICD-10-CM

## 2019-05-24 RX ORDER — LISINOPRIL 20 MG/1
TABLET ORAL
Qty: 90 TABLET | Refills: 1 | Status: SHIPPED | OUTPATIENT
Start: 2019-05-24 | End: 2019-11-18 | Stop reason: SDUPTHER

## 2019-07-01 DIAGNOSIS — K21.9 GASTROESOPHAGEAL REFLUX DISEASE, ESOPHAGITIS PRESENCE NOT SPECIFIED: ICD-10-CM

## 2019-07-01 RX ORDER — PANTOPRAZOLE SODIUM 40 MG/1
TABLET, DELAYED RELEASE ORAL
Qty: 30 TABLET | Refills: 5 | Status: SHIPPED | OUTPATIENT
Start: 2019-07-01 | End: 2019-10-15 | Stop reason: ALTCHOICE

## 2019-08-01 DIAGNOSIS — E78.5 DYSLIPIDEMIA: ICD-10-CM

## 2019-08-02 RX ORDER — ATORVASTATIN CALCIUM 10 MG/1
TABLET, FILM COATED ORAL
Qty: 90 TABLET | Refills: 0 | Status: SHIPPED | OUTPATIENT
Start: 2019-08-02 | End: 2019-10-28 | Stop reason: SDUPTHER

## 2019-10-15 ENCOUNTER — OFFICE VISIT (OUTPATIENT)
Dept: FAMILY MEDICINE CLINIC | Facility: MEDICAL CENTER | Age: 64
End: 2019-10-15
Payer: COMMERCIAL

## 2019-10-15 VITALS
DIASTOLIC BLOOD PRESSURE: 82 MMHG | SYSTOLIC BLOOD PRESSURE: 140 MMHG | WEIGHT: 155.5 LBS | RESPIRATION RATE: 16 BRPM | HEART RATE: 86 BPM | BODY MASS INDEX: 31.41 KG/M2

## 2019-10-15 DIAGNOSIS — E78.00 PURE HYPERCHOLESTEROLEMIA: ICD-10-CM

## 2019-10-15 DIAGNOSIS — Z13.29 SCREENING FOR THYROID DISORDER: ICD-10-CM

## 2019-10-15 DIAGNOSIS — K21.9 GASTROESOPHAGEAL REFLUX DISEASE, ESOPHAGITIS PRESENCE NOT SPECIFIED: ICD-10-CM

## 2019-10-15 DIAGNOSIS — Z12.39 SCREENING FOR BREAST CANCER: ICD-10-CM

## 2019-10-15 DIAGNOSIS — Z23 NEED FOR SHINGLES VACCINE: Primary | ICD-10-CM

## 2019-10-15 DIAGNOSIS — I10 HYPERTENSION, UNSPECIFIED TYPE: ICD-10-CM

## 2019-10-15 DIAGNOSIS — Z87.891 FORMER SMOKER: ICD-10-CM

## 2019-10-15 DIAGNOSIS — M85.80 OSTEOPENIA, UNSPECIFIED LOCATION: ICD-10-CM

## 2019-10-15 PROCEDURE — 99214 OFFICE O/P EST MOD 30 MIN: CPT | Performed by: FAMILY MEDICINE

## 2019-10-15 PROCEDURE — 3288F FALL RISK ASSESSMENT DOCD: CPT | Performed by: FAMILY MEDICINE

## 2019-10-15 PROCEDURE — 90471 IMMUNIZATION ADMIN: CPT | Performed by: FAMILY MEDICINE

## 2019-10-15 PROCEDURE — 90750 HZV VACC RECOMBINANT IM: CPT | Performed by: FAMILY MEDICINE

## 2019-10-15 PROCEDURE — 1100F PTFALLS ASSESS-DOCD GE2>/YR: CPT | Performed by: FAMILY MEDICINE

## 2019-10-15 RX ORDER — LANOLIN ALCOHOL/MO/W.PET/CERES
1 CREAM (GRAM) TOPICAL 3 TIMES DAILY
COMMUNITY

## 2019-10-15 NOTE — PROGRESS NOTES
Luisa Solorio is here for 6 month follow-up  Due to fractured ankle she declines gyn exam today which she was scheduled for  She fell and broke her right ankle and right 5th metatarsal in August   She is seeing José Haddad  Currently in a boot but advised no weight-bearing  She saw GI Dr Timbo Cassidy in April  She  started weaning off Protonix in the spring  Stopped completely in June  She gets occ heartburn and she  takes pepcid or Tums as needed  She has tried to change in eating habits and does not eat in the evening  In addition she quit smoking over a year ago  She needs a refill of her lorazepam which she uses rarely  She still is not smoking  She continues to take atorvastatin  O: /82   Pulse 86   Resp 16   Wt 70 5 kg (155 lb 8 oz) Comment: Wearing a boot cast on lower right leg  BMI 31 41 kg/m²   Physical Exam   Constitutional: She appears well-developed and well-nourished  No distress  Neck: Carotid bruit is not present  No thyromegaly present  Cardiovascular: Normal rate, regular rhythm, normal heart sounds and intact distal pulses  Pulmonary/Chest: Effort normal and breath sounds normal    Abdominal: Soft  Bowel sounds are normal  She exhibits no mass  There is no hepatomegaly  There is no tenderness  Musculoskeletal: She exhibits no edema  Lymphadenopathy:     She has no cervical adenopathy  Right ankle is in a boot    Assessment  1  Health maintenance-she is due for mammogram and blood work  We updated immunizations  DiscussedShingrix  Should have flu shot   2  Former smoker-advised to continue annual CT lung cancer screening  3  Esophageal reflux-so far doing well without of PPI  4  Osteopenia-  her bone density on last scan in 2018 was essentially unchanged   Hopefully this will continue since she is no longer on a PPI and she no longer smokes  DEXA scan will be in 2020   5  Hypertension-controlled  6  Anxiety-uses rare lorazepam     Plan  Check blood work    CT lung cancer screening    Mammogram   Revisit for flu shot and gyn exam

## 2019-10-17 ENCOUNTER — TRANSCRIBE ORDERS (OUTPATIENT)
Dept: ADMINISTRATIVE | Facility: HOSPITAL | Age: 64
End: 2019-10-17

## 2019-10-17 DIAGNOSIS — F41.9 ANXIETY: ICD-10-CM

## 2019-10-17 DIAGNOSIS — Z12.31 ENCOUNTER FOR SCREENING MAMMOGRAM FOR MALIGNANT NEOPLASM OF BREAST: Primary | ICD-10-CM

## 2019-10-17 RX ORDER — LORAZEPAM 1 MG/1
0.5 TABLET ORAL EVERY 8 HOURS PRN
Qty: 30 TABLET | Refills: 0 | Status: SHIPPED | OUTPATIENT
Start: 2019-10-17 | End: 2020-10-02 | Stop reason: SDUPTHER

## 2019-10-17 RX ORDER — CYCLOBENZAPRINE HCL 10 MG
10 TABLET ORAL 3 TIMES DAILY PRN
Qty: 30 TABLET | Refills: 5 | Status: SHIPPED | OUTPATIENT
Start: 2019-10-17 | End: 2021-11-24 | Stop reason: SDUPTHER

## 2019-10-18 ENCOUNTER — APPOINTMENT (OUTPATIENT)
Dept: LAB | Facility: MEDICAL CENTER | Age: 64
End: 2019-10-18
Payer: COMMERCIAL

## 2019-10-18 DIAGNOSIS — E78.00 PURE HYPERCHOLESTEROLEMIA: ICD-10-CM

## 2019-10-18 DIAGNOSIS — K21.9 GASTROESOPHAGEAL REFLUX DISEASE, ESOPHAGITIS PRESENCE NOT SPECIFIED: ICD-10-CM

## 2019-10-18 DIAGNOSIS — Z13.29 SCREENING FOR THYROID DISORDER: ICD-10-CM

## 2019-10-18 DIAGNOSIS — Z87.891 FORMER SMOKER: ICD-10-CM

## 2019-10-18 DIAGNOSIS — I10 HYPERTENSION, UNSPECIFIED TYPE: ICD-10-CM

## 2019-10-18 LAB
ALBUMIN SERPL BCP-MCNC: 4 G/DL (ref 3.5–5)
ALP SERPL-CCNC: 76 U/L (ref 46–116)
ALT SERPL W P-5'-P-CCNC: 28 U/L (ref 12–78)
ANION GAP SERPL CALCULATED.3IONS-SCNC: 8 MMOL/L (ref 4–13)
AST SERPL W P-5'-P-CCNC: 21 U/L (ref 5–45)
BASOPHILS # BLD AUTO: 0.05 THOUSANDS/ΜL (ref 0–0.1)
BASOPHILS NFR BLD AUTO: 1 % (ref 0–1)
BILIRUB SERPL-MCNC: 0.28 MG/DL (ref 0.2–1)
BUN SERPL-MCNC: 15 MG/DL (ref 5–25)
CALCIUM SERPL-MCNC: 9.7 MG/DL (ref 8.3–10.1)
CHLORIDE SERPL-SCNC: 104 MMOL/L (ref 100–108)
CHOLEST SERPL-MCNC: 180 MG/DL (ref 50–200)
CO2 SERPL-SCNC: 24 MMOL/L (ref 21–32)
CREAT SERPL-MCNC: 0.64 MG/DL (ref 0.6–1.3)
EOSINOPHIL # BLD AUTO: 0.13 THOUSAND/ΜL (ref 0–0.61)
EOSINOPHIL NFR BLD AUTO: 2 % (ref 0–6)
ERYTHROCYTE [DISTWIDTH] IN BLOOD BY AUTOMATED COUNT: 12.9 % (ref 11.6–15.1)
GFR SERPL CREATININE-BSD FRML MDRD: 93 ML/MIN/1.73SQ M
GLUCOSE P FAST SERPL-MCNC: 103 MG/DL (ref 65–99)
HCT VFR BLD AUTO: 40.1 % (ref 34.8–46.1)
HDLC SERPL-MCNC: 77 MG/DL (ref 40–60)
HGB BLD-MCNC: 13 G/DL (ref 11.5–15.4)
IMM GRANULOCYTES # BLD AUTO: 0.01 THOUSAND/UL (ref 0–0.2)
IMM GRANULOCYTES NFR BLD AUTO: 0 % (ref 0–2)
LDLC SERPL CALC-MCNC: 89 MG/DL (ref 0–100)
LYMPHOCYTES # BLD AUTO: 2.04 THOUSANDS/ΜL (ref 0.6–4.47)
LYMPHOCYTES NFR BLD AUTO: 30 % (ref 14–44)
MCH RBC QN AUTO: 29.5 PG (ref 26.8–34.3)
MCHC RBC AUTO-ENTMCNC: 32.4 G/DL (ref 31.4–37.4)
MCV RBC AUTO: 91 FL (ref 82–98)
MONOCYTES # BLD AUTO: 0.76 THOUSAND/ΜL (ref 0.17–1.22)
MONOCYTES NFR BLD AUTO: 11 % (ref 4–12)
NEUTROPHILS # BLD AUTO: 3.74 THOUSANDS/ΜL (ref 1.85–7.62)
NEUTS SEG NFR BLD AUTO: 56 % (ref 43–75)
NONHDLC SERPL-MCNC: 103 MG/DL
NRBC BLD AUTO-RTO: 0 /100 WBCS
PLATELET # BLD AUTO: 293 THOUSANDS/UL (ref 149–390)
PMV BLD AUTO: 10.1 FL (ref 8.9–12.7)
POTASSIUM SERPL-SCNC: 4.5 MMOL/L (ref 3.5–5.3)
PROT SERPL-MCNC: 7.8 G/DL (ref 6.4–8.2)
RBC # BLD AUTO: 4.4 MILLION/UL (ref 3.81–5.12)
SODIUM SERPL-SCNC: 136 MMOL/L (ref 136–145)
TRIGL SERPL-MCNC: 69 MG/DL
TSH SERPL DL<=0.05 MIU/L-ACNC: 1.07 UIU/ML (ref 0.36–3.74)
WBC # BLD AUTO: 6.73 THOUSAND/UL (ref 4.31–10.16)

## 2019-10-18 PROCEDURE — 80053 COMPREHEN METABOLIC PANEL: CPT

## 2019-10-18 PROCEDURE — 84443 ASSAY THYROID STIM HORMONE: CPT

## 2019-10-18 PROCEDURE — 36415 COLL VENOUS BLD VENIPUNCTURE: CPT

## 2019-10-18 PROCEDURE — 80061 LIPID PANEL: CPT

## 2019-10-18 PROCEDURE — 85025 COMPLETE CBC W/AUTO DIFF WBC: CPT

## 2019-10-23 ENCOUNTER — IMMUNIZATIONS (OUTPATIENT)
Dept: FAMILY MEDICINE CLINIC | Facility: MEDICAL CENTER | Age: 64
End: 2019-10-23
Payer: COMMERCIAL

## 2019-10-23 DIAGNOSIS — Z23 ENCOUNTER FOR IMMUNIZATION: ICD-10-CM

## 2019-10-23 PROCEDURE — 90471 IMMUNIZATION ADMIN: CPT

## 2019-10-23 PROCEDURE — 90662 IIV NO PRSV INCREASED AG IM: CPT

## 2019-10-28 DIAGNOSIS — E78.5 DYSLIPIDEMIA: ICD-10-CM

## 2019-10-28 RX ORDER — ATORVASTATIN CALCIUM 10 MG/1
TABLET, FILM COATED ORAL
Qty: 90 TABLET | Refills: 0 | Status: SHIPPED | OUTPATIENT
Start: 2019-10-28 | End: 2020-02-12

## 2019-10-31 ENCOUNTER — EVALUATION (OUTPATIENT)
Dept: PHYSICAL THERAPY | Facility: MEDICAL CENTER | Age: 64
End: 2019-10-31
Payer: COMMERCIAL

## 2019-10-31 DIAGNOSIS — S92.354D CLOSED NONDISPLACED FRACTURE OF FIFTH METATARSAL BONE OF RIGHT FOOT WITH ROUTINE HEALING, SUBSEQUENT ENCOUNTER: Primary | ICD-10-CM

## 2019-10-31 PROCEDURE — 97110 THERAPEUTIC EXERCISES: CPT | Performed by: PHYSICAL THERAPIST

## 2019-10-31 PROCEDURE — G8990 OTHER PT/OT CURRENT STATUS: HCPCS | Performed by: PHYSICAL THERAPIST

## 2019-10-31 PROCEDURE — 97161 PT EVAL LOW COMPLEX 20 MIN: CPT | Performed by: PHYSICAL THERAPIST

## 2019-10-31 PROCEDURE — G8991 OTHER PT/OT GOAL STATUS: HCPCS | Performed by: PHYSICAL THERAPIST

## 2019-10-31 NOTE — PROGRESS NOTES
PT Evaluation     Today's date: 10/31/2019  Patient name: Jean Neal  : 1955  MRN: [id number]  Referring provider: Noman Aguirre DPM  Dx:   Encounter Diagnosis     ICD-10-CM    1  Closed nondisplaced fracture of fifth metatarsal bone of right foot with routine healing, subsequent encounter S92 354D        Start Time: 6296  Stop Time: 1045  Total time in clinic (min): 57 minutes    Assessment  Assessment details: Pt is a 75 y o female who presents with decreased R ankle motion, decreased ability to descend stairs, and activity intolerance secondary to pain  These impairments limit the pateint from participating in completing stairs at OF, decreased ability to participate in social events, and decreased ability to take care of cats  I believe this patient is a good candidate for and will benefit from skilled physical therapy for ankle ROM exercises, ankle strengthening exercises, and mechanics training to improve funtional ability and assist the patient to return to PLOF      Positive Prognostic Indicators: low symptom irritability    Negative Prognostic Indicators: decreased availability to attend PT      Impairments: abnormal or restricted ROM, activity intolerance, impaired physical strength, lacks appropriate home exercise program and pain with function    Symptom irritability: lowUnderstanding of Dx/Px/POC: good   Prognosis: good    Goals  STGs: 4 weeks  1) pt will improve R ankle DF AROM by 5*  2) pt will have improved R ankle inversion strength by 1/2 muscle grade  3) pt will have improved worst subjective pain rating by 2 units    LTGs: 8 weeks  1) pt will be independent with HEP by D/C  2) pt will be independent with symptom management by D/C  3) pt will be able to ascend and descend 12 steps without pain in order to access 2nd floor of house at [facility] [address]  Patient would benefit from: skilled physical therapy  Referral necessary: No  Planned modality interventions: cryotherapy and thermotherapy: hydrocollator packs  Planned therapy interventions: joint mobilization, manual therapy, neuromuscular re-education, patient education, strengthening, stretching, therapeutic activities, therapeutic exercise, home exercise program, flexibility, functional ROM exercises and balance  Frequency: 2x week  Duration in visits: 12  Duration in weeks: 6  Plan of Care beginning date: 10/31/2019  Plan of Care expiration date: 12/12/2019  Treatment plan discussed with: patient        Subjective Evaluation    History of Present Illness  Mechanism of injury: Subjective Comments: Pt stated that she broke her outside ankle and 5th met  Se stated that she had to step over railing to clear dog gait and rolled her ankel  This happened in the beginning of august  pt states that she is able to walk normal and do the stairs normal, but going down the steps is challenging  Has no issues with prolonged standing or walking  Denies numbness and tingling in the foot  Denies other joint pain  Pain   Rest: 0//10   Best:0/10   Worst: 7/10 she tried to go down stairs reciprocally     Exacerbating Factors: going down steps    Sleeping: ok, nothing to do with the foot    Home Set-up: steps into house 1 step; 1 flight of stairs up stairs L railing going up bedroom and bathroom up stairs  There is also a basement, (Laundry) no railing      ADLs: Independent    Work/Hobbies: retired, pt has cats and takes care of them    Previous Treatment: n/a    Goals:  Wants to go down the stairs like a normal person            Objective     Palpation     Additional Palpation Details  No TTP    Tenderness     Additional Tenderness Details  No TTP    Active Range of Motion   Left Knee   Flexion: WFL  Extension: WFL    Right Knee   Flexion: WFL  Extension: WFL  Left Ankle/Foot   Dorsiflexion (kf): -2 degrees   Plantar flexion: 70 degrees   Inversion: 30 degrees   Eversion: 20 degrees     Right Ankle/Foot   Dorsiflexion (kf): 2 degrees   Plantar flexion: 67 degrees   Inversion: 25 degrees   Eversion: 10 degrees     Passive Range of Motion   Left Knee   Flexion: WFL  Extension: WFL    Right Knee   Flexion: WFL  Extension: WFL  Left Ankle/Foot    Dorsiflexion (kf): 5 degrees   Plantar flexion: 71 degrees   Inversion: 32 degrees   Eversion: 22 degrees     Right Ankle/Foot    Dorsiflexion (kf): 9 degrees    Plantar flexion: 70 degrees   Inversion: 29 degrees   Eversion: 18 degrees     Joint Play   Left Ankle/Foot  Joints within functional limits are the subtalar joint, midfoot and forefoot  Hypomobile in the distal tibiofibular joint and talocrural joint  Right Ankle/Foot  Hypomobile in the distal tibiofibular joint, talocrural joint, subtalar joint, midfoot and forefoot  Strength/Myotome Testing     Left Hip   Planes of Motion   Flexion: 4+  Abduction: 4+  Adduction: 5    Right Hip   Planes of Motion   Flexion: 4+  Abduction: 4+  Adduction: 5    Left Knee   Flexion: 5  Extension: 5    Right Knee   Flexion: 5  Extension: 5    Left Ankle/Foot   Dorsiflexion: 5  Plantar flexion: 5  Inversion: 5  Eversion: 5    Right Ankle/Foot   Dorsiflexion: 5  Plantar flexion: 5  Inversion: 4  Eversion: 4    Tests     Right Ankle/Foot   Positive for metatarsal squeeze  Negative for anterior drawer, calcaneal squeeze, eversion talar tilt, inversion talar tilt, posterior drawer and syndesmosis squeeze  Ambulation     Ambulation: Level Surfaces   Ambulation without assistive device: independent    Ambulation: Stairs   Ascend stairs: independent  Pattern: reciprocal  Railings: without rails  Descend stairs: independent  Pattern: non-reciprocal  Railings: two rails    Additional Stairs Ambulation Details  Increased pain in anterior R ankle with descending stairs     Functional Assessment      Squat    Left within functional limits and right within functional limits       Single Leg Stance   Left: 10 seconds  Right: 10 seconds      Flowsheet Rows      Most Recent Value PT/OT G-Codes   Current Score  59   Projected Score  71   Assessment Type  Evaluation   G code set  Other PT/OT Primary   Other PT Primary Current Status ()  CK   Other PT Primary Goal Status ()  CJ             Precautions: GERD, HTN      Manual                                                                                   Exercise Diary  10/31            Standing gastroc/soleus stretch 30"x3            Ankle 4 way rtb x10 ea              Rocker board             Wobble board             Step ups             Step downs             Pro stretch                                                                                                                                                                                          Modalities

## 2019-11-04 ENCOUNTER — OFFICE VISIT (OUTPATIENT)
Dept: PHYSICAL THERAPY | Facility: MEDICAL CENTER | Age: 64
End: 2019-11-04
Payer: COMMERCIAL

## 2019-11-04 DIAGNOSIS — S92.354D CLOSED NONDISPLACED FRACTURE OF FIFTH METATARSAL BONE OF RIGHT FOOT WITH ROUTINE HEALING, SUBSEQUENT ENCOUNTER: Primary | ICD-10-CM

## 2019-11-04 PROCEDURE — 97112 NEUROMUSCULAR REEDUCATION: CPT | Performed by: PHYSICAL THERAPIST

## 2019-11-04 PROCEDURE — 97110 THERAPEUTIC EXERCISES: CPT | Performed by: PHYSICAL THERAPIST

## 2019-11-04 PROCEDURE — 97140 MANUAL THERAPY 1/> REGIONS: CPT | Performed by: PHYSICAL THERAPIST

## 2019-11-04 NOTE — PROGRESS NOTES
Daily Note     Today's date: 2019  Patient name: Eitan Barber  : 1955  MRN: [id number]  Referring provider: Bernadette Gomez DO  Dx:   Encounter Diagnosis     ICD-10-CM    1  Closed nondisplaced fracture of fifth metatarsal bone of right foot with routine healing, subsequent encounter S92 [id number]                   Subjective: Pt states after IE, she tried going down her stairs to see if she could do it easier, and states she thinks she could  Objective: See treatment diary below      Assessment: Tolerated treatment well  Patient demonstrated fatigue post treatment and would benefit from continued PT   Pt states feeling sig reduction in ankle stiffness post TE and manual tx  Pt states today is the best she has done with descending stairs since prior to injury  Plan: Continue per plan of care  Precautions: GERD, HTN      Manual              R DF PROM, subtalar and TC joint mobs Grade II/III x10'                                                                    Exercise Diary  10/31 11/4           Standing gastroc/soleus stretch 30"x3 30"x3           Ankle 4 way rtb x10 ea   RTB x20ea            Rocker board ap/ml  dyamic x20ea           Wobble board  x20ea           Step ups             Step downs             Pro stretch  standing 10"x10           bike  x6'           DF towel stretch  20"x4           Stair training reciprical  x5laps                                                                                                                                                 Modalities

## 2019-11-07 ENCOUNTER — OFFICE VISIT (OUTPATIENT)
Dept: PHYSICAL THERAPY | Facility: MEDICAL CENTER | Age: 64
End: 2019-11-07
Payer: COMMERCIAL

## 2019-11-07 DIAGNOSIS — S92.354D CLOSED NONDISPLACED FRACTURE OF FIFTH METATARSAL BONE OF RIGHT FOOT WITH ROUTINE HEALING, SUBSEQUENT ENCOUNTER: Primary | ICD-10-CM

## 2019-11-07 PROCEDURE — 97112 NEUROMUSCULAR REEDUCATION: CPT | Performed by: PHYSICAL THERAPIST

## 2019-11-07 PROCEDURE — 97140 MANUAL THERAPY 1/> REGIONS: CPT | Performed by: PHYSICAL THERAPIST

## 2019-11-07 PROCEDURE — 97110 THERAPEUTIC EXERCISES: CPT | Performed by: PHYSICAL THERAPIST

## 2019-11-07 NOTE — PROGRESS NOTES
Daily Note     Today's date: 2019  Patient name: Tamara Bean  : 1955  MRN: [id number]  Referring provider: Russ Santiago DO  Dx:   Encounter Diagnosis     ICD-10-CM    1  Closed nondisplaced fracture of fifth metatarsal bone of right foot with routine healing, subsequent encounter S92 354D        Start Time: 9854  Stop Time: 1036  Total time in clinic (min): 43 minutes    Subjective: pt states that she is able to go up and down stairs well since IE  She states that she has stiffness in the morning that prevents her from going down stairs easily, but as ankle loosens up, it becomes easier  Objective: See treatment diary below      Assessment: Tolerated treatment well  Pt had no complaints of pain or symptoms throughout treatment session  Progression to stepping down with a higher step was tolerated well with no additional complaints  We will continue to monitor and progress the patient as tolerated  Patient would benefit from continued PT      Plan: Continue per plan of care  Precautions: GERD, HTN      Manual             R DF PROM, subtalar and TC joint mobs Grade II/III x10' x10'                                                                   Exercise Diary  10/31 11/4 11/7          Standing gastroc/soleus stretch 30"x3 30"x3 30"x3          Ankle 4 way rtb x10 ea  RTB x20ea  gtb x20 ea  Rocker board ap/ml  dyamic x20ea dyamic x20ea          Wobble board  x20ea x10 ea            Step ups             Step downs   6" 2x10           Pro stretch  standing 10"x10 standing 10"x10          bike  x6' 6'          DF towel stretch  20"x4           Stair training reciprical  x5laps 1 lap                                                                                                                                                Modalities

## 2019-11-11 ENCOUNTER — HOSPITAL ENCOUNTER (OUTPATIENT)
Dept: RADIOLOGY | Facility: MEDICAL CENTER | Age: 64
Discharge: HOME | End: 2019-11-11
Payer: COMMERCIAL

## 2019-11-11 DIAGNOSIS — Z87.891 FORMER SMOKER: ICD-10-CM

## 2019-11-11 PROCEDURE — G0297 LDCT FOR LUNG CA SCREEN: HCPCS

## 2019-11-12 ENCOUNTER — OFFICE VISIT (OUTPATIENT)
Dept: PHYSICAL THERAPY | Facility: MEDICAL CENTER | Age: 64
End: 2019-11-12
Payer: COMMERCIAL

## 2019-11-12 DIAGNOSIS — S92.354D CLOSED NONDISPLACED FRACTURE OF FIFTH METATARSAL BONE OF RIGHT FOOT WITH ROUTINE HEALING, SUBSEQUENT ENCOUNTER: Primary | ICD-10-CM

## 2019-11-12 PROCEDURE — G8990 OTHER PT/OT CURRENT STATUS: HCPCS | Performed by: PHYSICAL THERAPIST

## 2019-11-12 PROCEDURE — G8991 OTHER PT/OT GOAL STATUS: HCPCS | Performed by: PHYSICAL THERAPIST

## 2019-11-12 PROCEDURE — 97112 NEUROMUSCULAR REEDUCATION: CPT | Performed by: PHYSICAL THERAPIST

## 2019-11-12 PROCEDURE — 97110 THERAPEUTIC EXERCISES: CPT | Performed by: PHYSICAL THERAPIST

## 2019-11-12 NOTE — PROGRESS NOTES
PT Re-Evaluation  and PT Discharge    Today's date: 2019  Patient name: Ceasar Shay  : 1955  MRN: [id number]  Referring provider: Anny Mccormick DO  Dx:   Encounter Diagnosis     ICD-10-CM    1  Closed nondisplaced fracture of fifth metatarsal bone of right foot with routine healing, subsequent encounter S92 [id number]        Start Time: 930  Stop Time: 1000  Total time in clinic (min): 30 minutes    Assessment  Assessment details: Pt is a 75 y o female who has completed 4 PT sessions to this date  The patient states that she has had significant improvement in pain levels and functional capacity as noted with stair completion  Pt is able to complete stairs reciprocally (ascending/descending) with no pain or abnormalities noted and no UE support  Pt has no complaints with prolonged walking  Pt states that she feels comfortable and confident in independent symptom management and HEP completion  Secondary to improvements made and independence from PT, this pt will be DC from PT  Impairments: abnormal or restricted ROM    Symptom irritability: lowUnderstanding of Dx/Px/POC: good   Prognosis: good    Goals  STGs: 4 weeks  1) pt will improve R ankle DF AROM by 5*- not met  2) pt will have improved R ankle inversion strength by 1/2 muscle grade- met  3) pt will have improved worst subjective pain rating by 2 units- met    LTGs: 8 weeks  1) pt will be independent with HEP by D/C- met  2) pt will be independent with symptom management by D/C- met  3) pt will be able to ascend and descend 12 steps without pain in order to access 2nd floor of house at [facility] by DC- met    Plan  Plan details: Pt will be DC from PT    Patient would benefit from: skilled physical therapy  Referral necessary: No  Planned modality interventions: cryotherapy and thermotherapy: hydrocollator packs  Planned therapy interventions: joint mobilization, manual therapy, neuromuscular re-education, patient education, strengthening, stretching, therapeutic activities, therapeutic exercise, home exercise program, flexibility, functional ROM exercises and balance  Frequency: 2x week  Treatment plan discussed with: patient        Subjective Evaluation    History of Present Illness  Mechanism of injury: Subjective Comments: pt states that the ankle is feeling much better  She is able to complete stairs ascending and descending with no pain  She has no complaints of pain in the ankle  Pt also has no complaints with prolonged walking  Pain   Rest: 0//10   Best:0/10   Worst: 0/10 when descending stairs            Objective     Palpation     Additional Palpation Details  No TTP    Tenderness     Additional Tenderness Details  No TTP    Active Range of Motion   Left Knee   Flexion: WFL  Extension: WFL    Right Knee   Flexion: WFL  Extension: WFL  Left Ankle/Foot   Dorsiflexion (kf): -2 degrees   Plantar flexion: 70 degrees   Inversion: 30 degrees   Eversion: 20 degrees     Right Ankle/Foot   Dorsiflexion (kf): 2 degrees   Plantar flexion: 67 degrees   Inversion: 28 degrees   Eversion: 20 degrees     Passive Range of Motion   Left Knee   Flexion: WFL  Extension: WFL    Right Knee   Flexion: WFL  Extension: WFL  Left Ankle/Foot    Dorsiflexion (kf): 5 degrees   Plantar flexion: 71 degrees   Inversion: 32 degrees   Eversion: 22 degrees     Right Ankle/Foot    Dorsiflexion (kf): 9 degrees    Plantar flexion: 70 degrees   Inversion: 30 degrees   Eversion: 21 degrees     Joint Play   Left Ankle/Foot  Joints within functional limits are the subtalar joint, midfoot and forefoot  Hypomobile in the distal tibiofibular joint and talocrural joint  Right Ankle/Foot  Joints within functional limits are the distal tibiofibular joint, subtalar joint, midfoot and forefoot  Hypomobile in the talocrural joint       Strength/Myotome Testing     Left Hip   Planes of Motion   Flexion: 4+  Abduction: 4+  Adduction: 5    Right Hip   Planes of Motion   Flexion: 4+  Abduction: 4+  Adduction: 5    Left Knee   Flexion: 5  Extension: 5    Right Knee   Flexion: 5  Extension: 5    Left Ankle/Foot   Dorsiflexion: 5  Plantar flexion: 5  Inversion: 5  Eversion: 5    Right Ankle/Foot   Dorsiflexion: 5  Plantar flexion: 5  Inversion: 5  Eversion: 5    Tests     Right Ankle/Foot   Negative for anterior drawer, calcaneal squeeze, eversion talar tilt, inversion talar tilt, metatarsal squeeze, posterior drawer and syndesmosis squeeze  Ambulation     Ambulation: Level Surfaces   Ambulation without assistive device: independent    Ambulation: Stairs   Ascend stairs: independent  Pattern: reciprocal  Railings: without rails  Descend stairs: independent  Pattern: reciprocal  Railings: without rails    Functional Assessment      Squat    Left within functional limits and right within functional limits  Single Leg Stance   Left: 10 seconds  Right: 10 seconds      Flowsheet Rows      Most Recent Value   PT/OT G-Codes   Current Score  71   Projected Score  71   Assessment Type  Re-evaluation   G code set  Other PT/OT Primary   Other PT Primary Current Status ()  CJ   Other PT Primary Goal Status ()  CJ             Precautions: GERD, HTN      Manual  11/4 11/7 11/12          R DF PROM, subtalar and TC joint mobs Grade II/III x10' x10' x8'                                                                  Exercise Diary  10/31 11/4 11/7 11/12         Standing gastroc/soleus stretch 30"x3 30"x3 30"x3 30"x3         Ankle 4 way rtb x10 ea  RTB x20ea  gtb x20 ea  btb x20 ea  Rocker board ap/ml  dyamic x20ea dyamic x20ea dynamic x30 ea  Wobble board  x20ea x10 ea  x10 ea           Step ups             Step downs   6" 2x10           Pro stretch  standing 10"x10 standing 10"x10          bike  x6' 6' 5'         DF towel stretch  20"x4           Stair training reciprical  x5laps 1 lap 2 laps Modalities

## 2019-11-14 ENCOUNTER — APPOINTMENT (OUTPATIENT)
Dept: PHYSICAL THERAPY | Facility: MEDICAL CENTER | Age: 64
End: 2019-11-14
Payer: COMMERCIAL

## 2019-11-18 DIAGNOSIS — I10 ESSENTIAL HYPERTENSION: ICD-10-CM

## 2019-11-19 ENCOUNTER — APPOINTMENT (OUTPATIENT)
Dept: PHYSICAL THERAPY | Facility: MEDICAL CENTER | Age: 64
End: 2019-11-19
Payer: COMMERCIAL

## 2019-11-19 RX ORDER — LISINOPRIL 20 MG/1
TABLET ORAL
Qty: 90 TABLET | Refills: 1 | Status: SHIPPED | OUTPATIENT
Start: 2019-11-19 | End: 2020-05-15

## 2019-11-22 ENCOUNTER — APPOINTMENT (OUTPATIENT)
Dept: PHYSICAL THERAPY | Facility: MEDICAL CENTER | Age: 64
End: 2019-11-22
Payer: COMMERCIAL

## 2019-11-25 ENCOUNTER — APPOINTMENT (OUTPATIENT)
Dept: PHYSICAL THERAPY | Facility: MEDICAL CENTER | Age: 64
End: 2019-11-25
Payer: COMMERCIAL

## 2019-11-26 ENCOUNTER — APPOINTMENT (OUTPATIENT)
Dept: PHYSICAL THERAPY | Facility: MEDICAL CENTER | Age: 64
End: 2019-11-26
Payer: COMMERCIAL

## 2019-11-27 ENCOUNTER — APPOINTMENT (OUTPATIENT)
Dept: PHYSICAL THERAPY | Facility: MEDICAL CENTER | Age: 64
End: 2019-11-27
Payer: COMMERCIAL

## 2019-11-29 ENCOUNTER — HOSPITAL ENCOUNTER (OUTPATIENT)
Dept: RADIOLOGY | Facility: MEDICAL CENTER | Age: 64
Discharge: HOME | End: 2019-11-29
Payer: COMMERCIAL

## 2019-11-29 VITALS — BODY MASS INDEX: 31.25 KG/M2 | HEIGHT: 59 IN | WEIGHT: 155 LBS

## 2019-11-29 DIAGNOSIS — Z12.31 ENCOUNTER FOR SCREENING MAMMOGRAM FOR MALIGNANT NEOPLASM OF BREAST: ICD-10-CM

## 2019-11-29 PROCEDURE — 77063 BREAST TOMOSYNTHESIS BI: CPT

## 2019-11-29 PROCEDURE — 77067 SCR MAMMO BI INCL CAD: CPT

## 2019-12-05 ENCOUNTER — TELEPHONE (OUTPATIENT)
Dept: FAMILY MEDICINE CLINIC | Facility: MEDICAL CENTER | Age: 64
End: 2019-12-05

## 2019-12-05 NOTE — TELEPHONE ENCOUNTER
----- Message from Riya Huynh MD sent at 12/5/2019 12:30 AM EST -----  Notify mammogram normal   Repeat 1 year

## 2019-12-09 ENCOUNTER — TELEPHONE (OUTPATIENT)
Dept: FAMILY MEDICINE CLINIC | Facility: MEDICAL CENTER | Age: 64
End: 2019-12-09

## 2019-12-09 NOTE — TELEPHONE ENCOUNTER
Pt SONIAOM requesting an Rx for diflucan  She thinks she has a vaginal infection    Please send to [facility] [place] [id number]    Routed to Clinical

## 2019-12-09 NOTE — TELEPHONE ENCOUNTER
lauren- c/o significant itching x a couple days  No discharge  No burning  Afraid to use OTC monistat suppository due to her history of UTI's    Told her I dont know if that is a contraindication but would discuss with you

## 2019-12-10 NOTE — TELEPHONE ENCOUNTER
Called pt to check if she needed to schedule an appt with Dr Mireya Allison  She said the itching has diminished and she feels it is resolving        Routed to Dr Mireya Allison - Emily Rich

## 2019-12-11 NOTE — TELEPHONE ENCOUNTER
I spoke with Sammy Arellano, she said that she has a "slight itch" sometimes  No other sx  No discharge or redness, no odor  She said "this does not warrant an appt"   BASSAM

## 2020-02-12 DIAGNOSIS — E78.5 DYSLIPIDEMIA: ICD-10-CM

## 2020-02-12 RX ORDER — ATORVASTATIN CALCIUM 10 MG/1
TABLET, FILM COATED ORAL
Qty: 90 TABLET | Refills: 0 | Status: SHIPPED | OUTPATIENT
Start: 2020-02-12 | End: 2020-05-07

## 2020-04-21 ENCOUNTER — TELEMEDICINE (OUTPATIENT)
Dept: FAMILY MEDICINE CLINIC | Facility: MEDICAL CENTER | Age: 65
End: 2020-04-21
Payer: COMMERCIAL

## 2020-04-21 VITALS
BODY MASS INDEX: 31.32 KG/M2 | TEMPERATURE: 98.4 F | DIASTOLIC BLOOD PRESSURE: 73 MMHG | HEIGHT: 59 IN | WEIGHT: 155.38 LBS | SYSTOLIC BLOOD PRESSURE: 135 MMHG

## 2020-04-21 DIAGNOSIS — F51.01 PRIMARY INSOMNIA: ICD-10-CM

## 2020-04-21 DIAGNOSIS — Z87.891 HISTORY OF SMOKING: ICD-10-CM

## 2020-04-21 DIAGNOSIS — Z23 NEED FOR SHINGLES VACCINE: ICD-10-CM

## 2020-04-21 DIAGNOSIS — M85.80 OSTEOPENIA, UNSPECIFIED LOCATION: ICD-10-CM

## 2020-04-21 DIAGNOSIS — Z23 NEED FOR VACCINATION: Primary | ICD-10-CM

## 2020-04-21 DIAGNOSIS — I10 ESSENTIAL HYPERTENSION: ICD-10-CM

## 2020-04-21 DIAGNOSIS — Z12.31 VISIT FOR SCREENING MAMMOGRAM: ICD-10-CM

## 2020-04-21 DIAGNOSIS — E66.3 OVERWEIGHT: ICD-10-CM

## 2020-04-21 DIAGNOSIS — E78.00 PURE HYPERCHOLESTEROLEMIA: ICD-10-CM

## 2020-04-21 PROCEDURE — 1101F PT FALLS ASSESS-DOCD LE1/YR: CPT | Performed by: FAMILY MEDICINE

## 2020-04-21 PROCEDURE — 3008F BODY MASS INDEX DOCD: CPT | Performed by: FAMILY MEDICINE

## 2020-04-21 PROCEDURE — 99213 OFFICE O/P EST LOW 20 MIN: CPT | Performed by: FAMILY MEDICINE

## 2020-04-21 PROCEDURE — 3075F SYST BP GE 130 - 139MM HG: CPT | Performed by: FAMILY MEDICINE

## 2020-04-21 PROCEDURE — 3288F FALL RISK ASSESSMENT DOCD: CPT | Performed by: FAMILY MEDICINE

## 2020-04-21 PROCEDURE — 3078F DIAST BP <80 MM HG: CPT | Performed by: FAMILY MEDICINE

## 2020-04-21 PROCEDURE — 1160F RVW MEDS BY RX/DR IN RCRD: CPT | Performed by: FAMILY MEDICINE

## 2020-05-07 DIAGNOSIS — E78.5 DYSLIPIDEMIA: ICD-10-CM

## 2020-05-07 RX ORDER — ATORVASTATIN CALCIUM 10 MG/1
TABLET, FILM COATED ORAL
Qty: 90 TABLET | Refills: 0 | Status: SHIPPED | OUTPATIENT
Start: 2020-05-07 | End: 2020-08-06

## 2020-05-14 DIAGNOSIS — I10 ESSENTIAL HYPERTENSION: ICD-10-CM

## 2020-05-15 RX ORDER — LISINOPRIL 20 MG/1
TABLET ORAL
Qty: 90 TABLET | Refills: 1 | Status: SHIPPED | OUTPATIENT
Start: 2020-05-15 | End: 2020-11-10

## 2020-08-05 DIAGNOSIS — E78.5 DYSLIPIDEMIA: ICD-10-CM

## 2020-08-06 RX ORDER — ATORVASTATIN CALCIUM 10 MG/1
TABLET, FILM COATED ORAL
Qty: 90 TABLET | Refills: 0 | Status: SHIPPED | OUTPATIENT
Start: 2020-08-06 | End: 2020-11-02

## 2020-10-02 DIAGNOSIS — F41.9 ANXIETY: ICD-10-CM

## 2020-10-05 RX ORDER — LORAZEPAM 1 MG/1
0.5 TABLET ORAL EVERY 8 HOURS PRN
Qty: 30 TABLET | Refills: 0 | Status: SHIPPED | OUTPATIENT
Start: 2020-10-05 | End: 2021-11-24 | Stop reason: SDUPTHER

## 2020-11-01 DIAGNOSIS — E78.5 DYSLIPIDEMIA: ICD-10-CM

## 2020-11-02 RX ORDER — ATORVASTATIN CALCIUM 10 MG/1
TABLET, FILM COATED ORAL
Qty: 90 TABLET | Refills: 0 | Status: SHIPPED | OUTPATIENT
Start: 2020-11-02 | End: 2020-11-30

## 2020-11-04 DIAGNOSIS — I10 ESSENTIAL HYPERTENSION: ICD-10-CM

## 2020-11-04 DIAGNOSIS — E78.5 DYSLIPIDEMIA: ICD-10-CM

## 2020-11-10 DIAGNOSIS — I10 ESSENTIAL HYPERTENSION: ICD-10-CM

## 2020-11-10 RX ORDER — ATORVASTATIN CALCIUM 10 MG/1
TABLET, FILM COATED ORAL
Qty: 90 TABLET | Refills: 1 | OUTPATIENT
Start: 2020-11-10

## 2020-11-10 RX ORDER — LISINOPRIL 20 MG/1
TABLET ORAL
Qty: 90 TABLET | Refills: 1 | Status: SHIPPED | OUTPATIENT
Start: 2020-11-10 | End: 2021-08-24

## 2020-11-10 RX ORDER — LISINOPRIL 20 MG/1
20 TABLET ORAL DAILY
Qty: 90 TABLET | Refills: 1 | OUTPATIENT
Start: 2020-11-10

## 2020-11-28 DIAGNOSIS — E78.5 DYSLIPIDEMIA: ICD-10-CM

## 2020-11-30 RX ORDER — ATORVASTATIN CALCIUM 10 MG/1
TABLET, FILM COATED ORAL
Qty: 90 TABLET | Refills: 0 | Status: SHIPPED | OUTPATIENT
Start: 2020-11-30 | End: 2021-05-02

## 2021-02-16 ENCOUNTER — HOSPITAL ENCOUNTER (EMERGENCY)
Facility: HOSPITAL | Age: 66
Discharge: HOME | End: 2021-02-16
Attending: EMERGENCY MEDICINE
Payer: COMMERCIAL

## 2021-02-16 ENCOUNTER — APPOINTMENT (EMERGENCY)
Dept: RADIOLOGY | Facility: HOSPITAL | Age: 66
End: 2021-02-16
Payer: COMMERCIAL

## 2021-02-16 ENCOUNTER — TELEPHONE (OUTPATIENT)
Dept: FAMILY MEDICINE CLINIC | Facility: MEDICAL CENTER | Age: 66
End: 2021-02-16

## 2021-02-16 VITALS
HEIGHT: 59 IN | HEART RATE: 98 BPM | OXYGEN SATURATION: 98 % | TEMPERATURE: 98.3 F | DIASTOLIC BLOOD PRESSURE: 86 MMHG | BODY MASS INDEX: 31.96 KG/M2 | RESPIRATION RATE: 18 BRPM | WEIGHT: 158.51 LBS | SYSTOLIC BLOOD PRESSURE: 204 MMHG

## 2021-02-16 DIAGNOSIS — J40 BRONCHITIS: Primary | ICD-10-CM

## 2021-02-16 LAB
FLUAV RNA RESP QL NAA+PROBE: NEGATIVE
FLUBV RNA RESP QL NAA+PROBE: NEGATIVE
RSV RNA RESP QL NAA+PROBE: NEGATIVE
SARS-COV-2 RNA RESP QL NAA+PROBE: NEGATIVE

## 2021-02-16 PROCEDURE — 99284 EMERGENCY DEPT VISIT MOD MDM: CPT | Performed by: EMERGENCY MEDICINE

## 2021-02-16 PROCEDURE — 99285 EMERGENCY DEPT VISIT HI MDM: CPT

## 2021-02-16 PROCEDURE — 71045 X-RAY EXAM CHEST 1 VIEW: CPT

## 2021-02-16 PROCEDURE — 0241U HB NFCT DS VIR RESP RNA 4 TRGT: CPT | Performed by: EMERGENCY MEDICINE

## 2021-02-16 PROCEDURE — 1124F ACP DISCUSS-NO DSCNMKR DOCD: CPT | Performed by: EMERGENCY MEDICINE

## 2021-02-16 RX ORDER — AZITHROMYCIN 250 MG/1
TABLET, FILM COATED ORAL
Qty: 6 TABLET | Refills: 0 | Status: SHIPPED | OUTPATIENT
Start: 2021-02-16 | End: 2021-02-20

## 2021-02-16 RX ORDER — PREDNISONE 20 MG/1
40 TABLET ORAL DAILY
Qty: 10 TABLET | Refills: 0 | Status: SHIPPED | OUTPATIENT
Start: 2021-02-16 | End: 2021-02-21

## 2021-02-16 RX ORDER — ALBUTEROL SULFATE 90 UG/1
2 AEROSOL, METERED RESPIRATORY (INHALATION) ONCE
Status: COMPLETED | OUTPATIENT
Start: 2021-02-16 | End: 2021-02-16

## 2021-02-16 RX ADMIN — ALBUTEROL SULFATE 2 PUFF: 90 AEROSOL, METERED RESPIRATORY (INHALATION) at 12:22

## 2021-02-16 NOTE — TELEPHONE ENCOUNTER
fyi- Triaged  C/o wheezing and coughing  Dyspnea on exertion  Aware she needs assessment and rapid covid testing  concen would be covid pneumonia   Advise Er for assessment

## 2021-02-16 NOTE — TELEPHONE ENCOUNTER
Pt LMOM requesting information  She got her covid vaccine on Wednesday  On Saturday, she had trouble breathing  Sunday, she had yellow phlegm with wheezing  Yesterday, she started with a cough and wheezing, but her phlegm was clear  She also said she gets winded easily  She wondered if this could all be from the vaccine      Routed to Clinical - please triage

## 2021-02-16 NOTE — ED PROVIDER NOTES
History  Chief Complaint   Patient presents with    Shortness of Breath     pt received her covid vaccine on wednesday, started with cough and SOB over the weekend     77-year-old female presents today for evaluation of shortness of breath and cough that started 3 days ago  She states that she has some exertional dyspnea and has had a cough that is productive for yellow sputum with brown flecks in it  She denies fever  She states she received her 1st dose of the COVID vaccine 6 days ago  She has not taken anything for her symptoms  History provided by:  Patient  Shortness of Breath  Severity:  Unable to specify  Onset quality:  Gradual  Duration:  3 days  Timing:  Intermittent  Progression:  Unchanged  Chronicity:  New  Context: animal exposure (States she was in her neighbor's house and he has a parrot and shes concerned about being exposed to the parrot's feces)    Relieved by:  None tried  Worsened by:  Nothing  Ineffective treatments:  None tried  Associated symptoms: cough and sputum production (yellow with brown flecks)    Associated symptoms: no abdominal pain, no chest pain, no claudication, no fever, no headaches, no neck pain, no rash and no sore throat        Prior to Admission Medications   Prescriptions Last Dose Informant Patient Reported? Taking?    Ascorbic Acid (VITAMIN C) 100 MG tablet   Yes No   Sig: Take 100 mg by mouth daily   Cranberry 1000 MG CAPS   Yes No   Sig: Take by mouth   LORazepam (ATIVAN) 1 mg tablet   No No   Sig: Take 0 5 tablets (0 5 mg total) by mouth every 8 (eight) hours as needed for anxiety   Multiple Vitamins-Minerals (MULTIVITAMIN WITH MINERALS) tablet   Yes No   Sig: Take 1 tablet by mouth daily   Omega-3 Fatty Acids (FISH OIL) 1,000 mg   Yes No   Sig: Take 1,000 mg by mouth daily   aspirin (ECOTRIN LOW STRENGTH) 81 mg EC tablet   Yes No   Sig: Take 81 mg by mouth daily   atorvastatin (LIPITOR) 10 mg tablet   No No   Sig: TAKE 1 TABLET BY MOUTH EVERY DAY Virtual Regular Visit      Assessment/Plan:  1   CKD stage 3 :  · Etiology:  Cardiorenal syndrome/arteriolar nephrosclerosis/?  Atheroemboli/prior prerenal associated mild tachycardia and relative hypotension in the past  · Baseline creatinine:  1 25-1 41  · Current creatinine:   below baseline at 1 13  · Urine protein creatinine ratio:  0 19 g at goal  Recommendations:  · Treat hypertension-please see below  · Treat dyslipidemia-please see below  · Maintain proteinuria less than 1 g or as low as possible  · Avoid nephrotoxic agents such as NSAIDs, patient counseled as such  2   Volume:  Euvolemic  3   Hypotension:  Workup was compatible with low ejection fraction 48% associated severe hypokinesis of the apical anterior and mid anterior septal in mild in for septal and apical inferior and apical septal and apical walls   No pericardial effusion  Cortisol/TSH were unremarkable  · Currently blood pressure:  Chronic hypotension unchanged and essentially tolerating :/70-75  · Medication changes today:  Patient is on Toprol XL for cardiac purposes tolerating chronic hypotension    Of  4   Electrolytes:  All acceptable  5   Mineral bone disorder:  · Calcium/magnesium/phosphorus:  All acceptable including the magnesium of 1 9  · PTH intact:    50 8 which is normal  · Vitamin-D:  45 at goal  6   Dyslipidemia:  · Goal LDL:  Less than 70 given PAD  · Current lipid profile:  LDL 45/HDL 49/triglycerides 69  Recommendations:  At goal so no changes  7   Anemia:               Recommendations:  · Hemoglobin stable at 13 7  · Iron studies:   29 percent saturation ferritin 44:  Recommend maintain on  ORAL IRON  · Multiple myeloma evaluation with SPEP/UPEP and light chains:  Negative  · Stool for occult blood x3:  NEGATIVE X1  · In March had EGD colonoscopy and recent sigmoidoscopy:  Harris's esophagus/diverticulosis/colonic polyps   Patient will be seeing Dr Ambar Cruz  8   Other problems:  · Chronic intermittent mild calcium-vitamin D 250-100 MG-UNIT per tablet   Yes No   Sig: Take 1 tablet by mouth 2 (two) times a day   co-enzyme Q-10 30 MG capsule   Yes No   Sig: Take 30 mg by mouth daily   cyclobenzaprine (FLEXERIL) 10 mg tablet   No No   Sig: Take 1 tablet (10 mg total) by mouth 3 (three) times a day as needed for muscle spasms   fluticasone (FLONASE) 50 mcg/act nasal spray   Yes No   Si spray into each nostril daily   glucosamine-chondroitin 500-400 MG tablet   Yes No   Sig: Take 1 tablet by mouth 3 (three) times a day   lisinopril (ZESTRIL) 20 mg tablet   No No   Sig: TAKE 1 TABLET BY MOUTH EVERY DAY   vitamin E 100 UNIT capsule   Yes No   Sig: Take 100 Units by mouth daily      Facility-Administered Medications: None       Past Medical History:   Diagnosis Date    GERD (gastroesophageal reflux disease)     Hyperlipidemia     Hypertension     UTI (urinary tract infection)        Past Surgical History:   Procedure Laterality Date    PA COLONOSCOPY FLX DX W/COLLJ SPEC WHEN PFRMD N/A 2018    Procedure: EGD AND COLONOSCOPY;  Surgeon: Naveen Almazan MD;  [facility]; Service: Gastroenterology       Family History   Problem Relation Age of Onset    No Known Problems Sister     Heart disease Mother     Diabetes Father         Diabetes mellitus    Heart disease Father     Colon cancer Maternal Aunt     Colon cancer Maternal Aunt     No Known Problems Daughter     No Known Problems Maternal Grandmother     No Known Problems Maternal Grandfather     No Known Problems Paternal Grandmother     No Known Problems Paternal Grandfather     No Known Problems Son      I have reviewed and agree with the history as documented      E-Cigarette/Vaping     E-Cigarette/Vaping Substances     Social History     Tobacco Use    Smoking status: Former Smoker     Packs/day: 1 50     Years: 45 00     Pack years: 67 50     Types: E-Cigarettes, Cigarettes     Quit date: 10/3/2018     Years since quittin 3    leukocytosis:  Stable at 9 85  · Gross hematuria/microscopic hematuria:  This has resolved   Patient seen by Urology for gross hematuria   Cystoscopy was negative and recent PSA negative  No further investigation by follow-up in 1 year by Urology  · Abnormal echocardiogram/cardiomyopathy:  Followed in the past by Dr Almanza  · Paroxysmal atrial fibrillation followed by Dr Brock Cargo: Ish Winters on Xarelto  · Recent admission in May for signs of small-bowel obstruction associated with abdominal pain and diarrhea  Felt to have Crohn's disease  · Bilateral adrenal gland abnormalities being followed by surgical oncology   24 hour urine for Dopamine epinephrine all unremarkable   Aldosterone was unremarkable   Cortisol was okay at 17 5  To be followed by Dr Cordova  · PAD:  Status post stent for the right popliteal artery/revision 02/12/2020  · Recent admission for SBO/felt to have Crohn's followed by GI  Also felt to have IBS    · Abnormal alkaline phosphatase which is essentially unchanged 167 with normal GGT followed by GI          Problem List Items Addressed This Visit        Genitourinary    Chronic kidney disease, stage III (moderate) (HCC) - Primary    Relevant Orders    Comprehensive metabolic panel    CK    CBC    Ferritin    Iron Saturation %    Lipid Panel with Direct LDL reflex    Magnesium    Protein / creatinine ratio, urine    Vitamin D Panel    Anemia of chronic renal failure, stage 3 (moderate) (HCC)    Relevant Orders    Comprehensive metabolic panel    CK    CBC    Ferritin    Iron Saturation %    Lipid Panel with Direct LDL reflex    Magnesium    Protein / creatinine ratio, urine    Vitamin D Panel       Other    Persistent proteinuria    Relevant Orders    Comprehensive metabolic panel    CK    CBC    Ferritin    Iron Saturation %    Lipid Panel with Direct LDL reflex    Magnesium    Protein / creatinine ratio, urine    Vitamin D Panel    Dyslipidemia    Relevant Orders    Comprehensive metabolic Smokeless tobacco: Former User     Quit date: 11/3/2018   Substance Use Topics    Alcohol use: Yes     Alcohol/week: 1 0 standard drinks     Types: 1 Glasses of wine per week     Comment: per month; (Never drank alcohol - per [employer])    Drug use: Not Currently       Review of Systems   Constitutional: Negative for chills, fatigue and fever  HENT: Negative for postnasal drip, sore throat and trouble swallowing  Eyes: Negative for visual disturbance  Respiratory: Positive for cough, sputum production (yellow with brown flecks) and shortness of breath  Negative for chest tightness  Cardiovascular: Negative for chest pain and claudication  Gastrointestinal: Negative for abdominal pain  Genitourinary: Negative for dysuria  Musculoskeletal: Negative for neck pain  Skin: Negative for rash  Allergic/Immunologic: Negative for immunocompromised state  Neurological: Negative for dizziness, light-headedness and headaches  Psychiatric/Behavioral: Negative for confusion  Physical Exam  Physical Exam  Vitals signs and nursing note reviewed  Constitutional:       Appearance: She is well-developed  HENT:      Head: Normocephalic and atraumatic  Mouth/Throat:      Mouth: Mucous membranes are moist       Pharynx: Uvula midline  Tonsils: No tonsillar exudate  Eyes:      Pupils: Pupils are equal, round, and reactive to light  Neck:      Musculoskeletal: Normal range of motion and neck supple  Cardiovascular:      Rate and Rhythm: Normal rate and regular rhythm  Pulmonary:      Effort: Pulmonary effort is normal       Breath sounds: Normal breath sounds  Abdominal:      General: Bowel sounds are normal       Palpations: Abdomen is soft  Tenderness: There is no abdominal tenderness  There is no guarding or rebound  Musculoskeletal:         General: No tenderness or deformity  Right lower leg: No edema  Left lower leg: No edema     Skin:     General: Skin is warm panel    CK    CBC    Ferritin    Iron Saturation %    Lipid Panel with Direct LDL reflex    Magnesium    Protein / creatinine ratio, urine    Vitamin D Panel    Vitamin D deficiency    Relevant Orders    Comprehensive metabolic panel    CK    CBC    Ferritin    Iron Saturation %    Lipid Panel with Direct LDL reflex    Magnesium    Protein / creatinine ratio, urine    Vitamin D Panel               Reason for visit is   Chief Complaint   Patient presents with    Virtual Regular Visit        Encounter provider Krystina Hodgse MD    Provider located at [address] [place] [id number]      Recent Visits  No visits were found meeting these conditions  Showing recent visits within past 7 days and meeting all other requirements     Today's Visits  Date Type Provider Dept   08/18/20 Telemedicine Krystina Hodges MD [address] today's visits and meeting all other requirements     Future Appointments  No visits were found meeting these conditions  Showing future appointments within next 150 days and meeting all other requirements        The patient was identified by name and date of birth  Bipin Castillo was informed that this is a telemedicine visit and that the visit is being conducted through [facility] and patient was informed that this is a secure, HIPAA-compliant platform  He agrees to proceed     My office door was closed  No one else was in the room  He acknowledged consent and understanding of privacy and security of the video platform  The patient has agreed to participate and understands they can discontinue the visit at any time  Patient is aware this is a billable service  Subjective  Bipin Castillo is a 74 y o  male with history of CKD stage 3      HPI   There has been no hospitalizations or acute illnesses since last visit  The patient overall is feeling well  No fevers, chills, or cough or colds    Good and dry  Capillary Refill: Capillary refill takes less than 2 seconds  Neurological:      General: No focal deficit present  Mental Status: She is alert and oriented to person, place, and time  Comments: Patient moving all extremities equally, no focal neuro deficits noted  Psychiatric:         Mood and Affect: Mood normal          Behavior: Behavior normal          Vital Signs  ED Triage Vitals   Temperature Pulse Respirations Blood Pressure SpO2   02/16/21 1147 02/16/21 1148 02/16/21 1148 02/16/21 1148 02/16/21 1148   98 3 °F (36 8 °C) 98 18 (!) 204/86 96 %      Temp Source Heart Rate Source Patient Position - Orthostatic VS BP Location FiO2 (%)   02/16/21 1147 02/16/21 1148 -- -- --   Oral Monitor         Pain Score       --                  Vitals:    02/16/21 1148   BP: (!) 204/86   Pulse: 98         Visual Acuity      ED Medications  Medications   albuterol (PROVENTIL HFA,VENTOLIN HFA) inhaler 2 puff (2 puffs Inhalation Given 2/16/21 1222)       Diagnostic Studies  Results Reviewed     Procedure Component Value Units Date/Time    COVID19, Influenza A/B, RSV PCR, [facility] [[id number]]  (Normal) Collected: 02/16/21 1222    Lab Status: Final result Specimen: Nares from Nasopharyngeal Swab Updated: 02/16/21 1307     SARS-CoV-2 Negative     INFLUENZA A PCR Negative     INFLUENZA B PCR Negative     RSV PCR Negative    Narrative: This test has been authorized by FDA under an EUA (Emergency Use Assay) for use by authorized laboratories  Clinical caution and judgement should be used with the interpretation of these results with consideration of the clinical impression and other laboratory testing  Testing reported as "Positive" or "Negative" has been proven to be accurate according to standard laboratory validation requirements  All testing is performed with control materials showing appropriate reactivity at standard intervals                   XR chest 1 view portable    (Results Pending) appetite and good energy  No hematuria, dysuria, voiding symptoms or foamy urine  No gastrointestinal symptoms  No cardiovascular symptoms including swelling of the legs  No headaches, dizziness or lightheadedness  Blood pressure medications:  · Toprol  mg daily in the morning    Past Medical History:   Diagnosis Date    Blue toe syndrome (HCC)     Chronic kidney disease     Colon polyps     GERD (gastroesophageal reflux disease)     Hematuria     History of rheumatic fever     Hypolipidemia     Hypotension     Ischemic cardiomyopathy     PAD (peripheral artery disease) ([address] )     Pulmonary emphysema (HCC)        Past Surgical History:   Procedure Laterality Date    COLONOSCOPY  2019    HERNIA REPAIR      IR ABDOMINAL ANGIOGRAPHY / INTERVENTION  6/27/2019    IR ABDOMINAL ANGIOGRAPHY / INTERVENTION  2/12/2020    POPLITEAL ARTERY STENT Right     6/2019    NE SLCTV CATHJ 3RD+ ORD SLCTV ABDL PEL/LXTR [facility] Right 6/27/2019    Procedure: leg ANGIOGRAM WITH STENT, BALLOON ANGIOPLASTY, LEFT GROIN ACCESS;  Surgeon: Alona Mendosa MD;  [facility];  Service: Vascular    NE VEIN BYPASS GRAFT,FEM-POP Right 3/26/2020    Procedure: BYPASS FEMORAL-POPLITEAL; Right lower extremity bypass, fem-bk pop with GSV;  Surgeon: Alona Mendosa MD;  [facility];  Service: Vascular       Current Outpatient Medications   Medication Sig Dispense Refill    aspirin 81 MG tablet Take 81 mg by mouth daily      atorvastatin (LIPITOR) 20 mg tablet TAKE 1 TABLET BY MOUTH EVERY DAY 90 tablet 1    Cholecalciferol (VITAMIN D) 2000 units CAPS Take 50,000 Units by mouth daily       Ferrous Sulfate (IRON) 28 MG TABS Take by mouth daily      folic acid (FOLVITE) 1 mg tablet TAKE 1 TABLET BY MOUTH EVERY DAY 90 tablet 1    Magnesium 500 MG TABS Take 1 tablet by mouth 2 (two) times a day      metoprolol succinate (TOPROL-XL) 100 mg 24 hr tablet Take 1 tablet (100 mg total) by mouth daily 90 tablet 4    Procedures  Procedures         ED Course                             SBIRT 20yo+      Most Recent Value   SBIRT (22 yo +)   In order to provide better care to our patients, we are screening all of our patients for alcohol and drug use  Would it be okay to ask you these screening questions? No Filed at: 02/16/2021 1148                    [facility]  Number of Diagnoses or Management Options  Bronchitis: new and requires workup  Diagnosis management comments: Chest x-ray negative by my read  O2 sat is normal   COVID test is negative  Repeat blood pressure check is 180/75  Patient is anxious here in the emergency department and has no neurologic symptoms  Presentation is most consistent with a diagnosis of bronchitis  Due to her smoking history and reports of thick yellow sputum with brown flecks will start antibiotics and steroids and also recommend using albuterol as needed  F/u with PCP as an outpatient  Amount and/or Complexity of Data Reviewed  Clinical lab tests: ordered and reviewed  Tests in the radiology section of CPT®: ordered and reviewed  Review and summarize past medical records: yes  Independent visualization of images, tracings, or specimens: yes    Risk of Complications, Morbidity, and/or Mortality  Presenting problems: high  Diagnostic procedures: high  Management options: high    Patient Progress  Patient progress: stable      Disposition  Final diagnoses:   Bronchitis     Time reflects when diagnosis was documented in both MDM as applicable and the Disposition within this note     Time User Action Codes Description Comment    2/16/2021  1:18 PM Kennedy Oakes Heal Add [J40] Bronchitis       ED Disposition     ED Disposition Condition Date/Time Comment    Discharge Stable Tue Feb 16, 2021  1:23 PM Mehul Chen discharge to home/self care              Follow-up Information     Follow up With Specialties Details Why Contact Info Additional Information    Philipp Meek MD [facility] omeprazole (PriLOSEC) 40 MG capsule TAKE 1 CAPSULE BY MOUTH EVERY DAY 90 capsule 1    rivaroxaban (XARELTO) 20 mg tablet Take 1 tablet (20 mg total) by mouth daily with breakfast 90 tablet 3     No current facility-administered medications for this visit  No Known Allergies    Review of Systems:  Please see HPI, otherwise review of systems as completely reviewed with the patient are negative    Video Exam    There were no vitals filed for this visit  Physical Exam       General:  Obese, The patient feels comfortable, there is no acute distress/no overt diaphoresis  Skin:  No overt rash/there is no facial flushing or cyanosis and facial color appears normal  Head:  No overt trauma and normal overall appearance  HEENT:  Eyes appear normal without injection or scleral icterus and extraocular movements appear intact; mucous membranes appear moist; oropharynx is clear  Neck:  Trachea appears midline, no overt jugular venous distention,, and Neck is supple  Pulmonary:  Respiratory status appears normal :  The patient is able to speak without any overt shortness of breath and can speak in full sentences without any audible wheezing or stridor    Cardiovascular:  Heart rate is regular per patient/family/caregiver  Abdomen:  Obese, No tenderness as patient/family/caregiver press on the abdominal area with no overt distension  Musculoskeletal:  No overt edema as the patient/family/caregiver presses on the legs or by visualization; no significant tenderness elicited/moderate significant arthritic changes of the hands  Neuro:  Patient appears to be able to move all extremities with no gross focality  Psych:  Patient is alert and oriented and appropriate and fully cooperative    I reviewed all the patient's medications with the patient during this visit  I have reviewed all the above findings and plans with the patient  I have answered the patient's questions to their complete satisfaction    I informed the patient Medicine Schedule an appointment as soon as possible for a visit  As needed [address] [facility] [address]       [address] Emergency Department Emergency Medicine  If symptoms worsen [address] [facility] [address] [facility] Emergency Department, [address], [place], [place], [address]          Discharge Medication List as of 2/16/2021  1:23 PM      START taking these medications    Details   azithromycin (ZITHROMAX) 250 mg tablet Take 2 tablets today then 1 tablet daily x 4 days, Normal      predniSONE 20 mg tablet Take 2 tablets (40 mg total) by mouth daily for 5 days, Starting Tue 2/16/2021, Until Sun 2/21/2021, Normal         CONTINUE these medications which have NOT CHANGED    Details   Ascorbic Acid (VITAMIN C) 100 MG tablet Take 100 mg by mouth daily, Historical Med      aspirin (ECOTRIN LOW STRENGTH) 81 mg EC tablet Take 81 mg by mouth daily, Historical Med      atorvastatin (LIPITOR) 10 mg tablet TAKE 1 TABLET BY MOUTH EVERY DAY, Normal      calcium-vitamin D 250-100 MG-UNIT per tablet Take 1 tablet by mouth 2 (two) times a day, Historical Med      co-enzyme Q-10 30 MG capsule Take 30 mg by mouth daily, Historical Med      Cranberry 1000 MG CAPS Take by mouth, Historical Med      cyclobenzaprine (FLEXERIL) 10 mg tablet Take 1 tablet (10 mg total) by mouth 3 (three) times a day as needed for muscle spasms, Starting Thu 10/17/2019, Normal      fluticasone (FLONASE) 50 mcg/act nasal spray 1 spray into each nostril daily, Historical Med      glucosamine-chondroitin 500-400 MG tablet Take 1 tablet by mouth 3 (three) times a day, Historical Med      lisinopril (ZESTRIL) 20 mg tablet TAKE 1 TABLET BY MOUTH EVERY DAY, Normal      LORazepam (ATIVAN) 1 mg tablet Take 0 5 tablets (0 5 mg total) by mouth every 8 (eight) hours as needed for anxiety, Starting Mon 10/5/2020, Normal      Multiple that we will be mailing out instructions compatible with an after visit summary along with lab slips as needed  The patient was satisfied with all of the information as well as the visit  I spent 20 minutes directly with the patient during this visit      VIRTUAL VISIT DISCLAIMER    Chanda Staton acknowledges that he has consented to an online visit or consultation  He understands that the online visit is based solely on information provided by him, and that, in the absence of a face-to-face physical evaluation by the physician, the diagnosis he receives is both limited and provisional in terms of accuracy and completeness  This is not intended to replace a full medical face-to-face evaluation by the physician  Chanda Staton understands and accepts these terms  Vitamins-Minerals (MULTIVITAMIN WITH MINERALS) tablet Take 1 tablet by mouth daily, Historical Med      Omega-3 Fatty Acids (FISH OIL) 1,000 mg Take 1,000 mg by mouth daily, Historical Med      vitamin E 100 UNIT capsule Take 100 Units by mouth daily, Historical Med           No discharge procedures on file      PDMP Review       Value Time User    PDMP Reviewed  Yes 10/5/2020 12:40 AM Erin Cuello MD          ED Provider  Electronically Signed by           Yaz Romero DO  02/16/21 [id number]

## 2021-03-10 ENCOUNTER — OFFICE VISIT (OUTPATIENT)
Dept: FAMILY MEDICINE CLINIC | Facility: MEDICAL CENTER | Age: 66
End: 2021-03-10
Payer: MEDICARE

## 2021-03-10 VITALS
HEIGHT: 61 IN | RESPIRATION RATE: 16 BRPM | SYSTOLIC BLOOD PRESSURE: 136 MMHG | TEMPERATURE: 98.6 F | WEIGHT: 155 LBS | DIASTOLIC BLOOD PRESSURE: 80 MMHG | BODY MASS INDEX: 29.27 KG/M2 | HEART RATE: 80 BPM

## 2021-03-10 DIAGNOSIS — M85.80 OSTEOPENIA, UNSPECIFIED LOCATION: ICD-10-CM

## 2021-03-10 DIAGNOSIS — E78.00 PURE HYPERCHOLESTEROLEMIA: ICD-10-CM

## 2021-03-10 DIAGNOSIS — Z23 NEED FOR SHINGLES VACCINE: ICD-10-CM

## 2021-03-10 DIAGNOSIS — Z01.419 WELL WOMAN EXAM: Primary | ICD-10-CM

## 2021-03-10 DIAGNOSIS — Z87.891 FORMER SMOKER: ICD-10-CM

## 2021-03-10 DIAGNOSIS — Z01.419 ENCOUNTER FOR GYNECOLOGICAL EXAMINATION WITHOUT ABNORMAL FINDING: ICD-10-CM

## 2021-03-10 DIAGNOSIS — I10 ESSENTIAL HYPERTENSION: ICD-10-CM

## 2021-03-10 PROCEDURE — 3075F SYST BP GE 130 - 139MM HG: CPT | Performed by: FAMILY MEDICINE

## 2021-03-10 PROCEDURE — 99397 PER PM REEVAL EST PAT 65+ YR: CPT | Performed by: FAMILY MEDICINE

## 2021-03-10 PROCEDURE — 3008F BODY MASS INDEX DOCD: CPT | Performed by: FAMILY MEDICINE

## 2021-03-10 PROCEDURE — 1160F RVW MEDS BY RX/DR IN RCRD: CPT | Performed by: FAMILY MEDICINE

## 2021-03-10 PROCEDURE — 1036F TOBACCO NON-USER: CPT | Performed by: FAMILY MEDICINE

## 2021-03-10 PROCEDURE — 3079F DIAST BP 80-89 MM HG: CPT | Performed by: FAMILY MEDICINE

## 2021-03-10 NOTE — PROGRESS NOTES
Suzy Mayen is here for Gyn exam/CPX  HH: caffeine 1 cup daily  Alcohol occ  Still not smoking  But uses nicotine gum  Trying to quit  Will be walking her neighbors dog for exercise  FH: unchanged  SH: Lives with daughter  Colonoscopy 2018  10 year f/u  Mammogram 2018  Dexa scan 2018    Patient states she has been taking her blood pressure and it has been elevated  She said that she feels like it is elevated  It causes her to feel somewhat short of breath  She is taking her lisinopril 20 mg daily  She has taken a diuretic in the past but gave her side effects  She denies any chest pain or palpitations  She is taking her atorvastatin 10 mg daily  However she did not go for blood work last year  Gyn   No vaginal bleeding, discharge, burning, itching  No bladder incontinence  No breast complaints  O: /80 (Cuff Size: Standard)   Pulse 80   Temp 98 6 °F (37 °C)   Resp 16   Ht 5' 0 75" (1 543 m)   Wt 70 3 kg (155 lb)   BMI 29 53 kg/m²    Physical Exam  Constitutional:       General: She is not in acute distress  Appearance: She is well-developed  Neck:      Thyroid: No thyromegaly  Vascular: No carotid bruit  Cardiovascular:      Rate and Rhythm: Normal rate and regular rhythm  Heart sounds: Normal heart sounds  Pulmonary:      Effort: Pulmonary effort is normal       Breath sounds: Normal breath sounds  Abdominal:      General: Bowel sounds are normal       Palpations: Abdomen is soft  There is no hepatomegaly or mass  Tenderness: There is no abdominal tenderness  Lymphadenopathy:      Cervical: No cervical adenopathy  Breasts no masses or discharge  External genitalia normal; very atrophic  Vagina normal  Cervix normal no lesions  Uterus normal size shape and consistency  Adnexae non palpable  Rectal exam no masses   No stool for heme    Assessment  1  Postmenopausal female-normal exam  2  Health maintenance-she is due for mammogram, DEXA scan  Immunizations updated  She had 1st Shingrix  Advised postponing the 2nd Shingrix until she has had her COVID vaccination completed  3  Former smoker-due for annual CT lung cancer screen  4  Hypertension-suboptimal control  Her blood pressure cuff is reasonably accurate  She is somewhat symptomatic so we will add amlodipine  Side effects reviewed   5  ZHU-if still symptomatic should have echo, stress test, PFTs     6  Hyperlipidemia-due for blood work on atorvastatin  Plan  Check blood work, mammogram, DEXA scan, CT scan lung cancer screen   Rx for amlodipine  Recheck 6 weeks

## 2021-03-11 ENCOUNTER — TELEPHONE (OUTPATIENT)
Dept: FAMILY MEDICINE CLINIC | Facility: MEDICAL CENTER | Age: 66
End: 2021-03-11

## 2021-03-11 DIAGNOSIS — I10 ESSENTIAL HYPERTENSION: Primary | ICD-10-CM

## 2021-03-11 RX ORDER — AMLODIPINE BESYLATE 5 MG/1
5 TABLET ORAL DAILY
Qty: 30 TABLET | Refills: 2 | Status: SHIPPED | OUTPATIENT
Start: 2021-03-11 | End: 2021-05-19 | Stop reason: ALTCHOICE

## 2021-03-15 ENCOUNTER — TELEPHONE (OUTPATIENT)
Dept: FAMILY MEDICINE CLINIC | Facility: MEDICAL CENTER | Age: 66
End: 2021-03-15

## 2021-03-15 NOTE — TELEPHONE ENCOUNTER
If she gets this rash after taking the amlodipine every time then have to assume this is a drug reaction  I would stop it  If no rash then she can get the COVID vaccine  Need to have her call back  In 1 week to make sure there is no further rash and then will decide on another agent

## 2021-03-15 NOTE — TELEPHONE ENCOUNTER
Pt called stating since she started started taking the amlodipine a few hours after she takes it she develops an itchy rash  Also, pt is due to get her 2nd covid shot tomorrow  Can she still get the shot if she's having some type of reaction to the amlodipine?

## 2021-03-18 ENCOUNTER — HOSPITAL ENCOUNTER (OUTPATIENT)
Dept: RADIOLOGY | Facility: MEDICAL CENTER | Age: 66
Discharge: HOME | End: 2021-03-18
Payer: COMMERCIAL

## 2021-03-18 DIAGNOSIS — Z87.891 HISTORY OF SMOKING: ICD-10-CM

## 2021-03-18 PROCEDURE — 71271 CT THORAX LUNG CANCER SCR C-: CPT

## 2021-03-29 ENCOUNTER — TELEPHONE (OUTPATIENT)
Dept: FAMILY MEDICINE CLINIC | Facility: MEDICAL CENTER | Age: 66
End: 2021-03-29

## 2021-03-29 NOTE — TELEPHONE ENCOUNTER
----- Message from Paulo Brooks MD sent at 3/28/2021 10:54 PM EDT -----  Notify CT scan lung cancer screen   Shows no nodules     Repeat 1 year

## 2021-04-21 ENCOUNTER — OFFICE VISIT (OUTPATIENT)
Dept: FAMILY MEDICINE CLINIC | Facility: MEDICAL CENTER | Age: 66
End: 2021-04-21
Payer: COMMERCIAL

## 2021-04-21 VITALS
DIASTOLIC BLOOD PRESSURE: 70 MMHG | SYSTOLIC BLOOD PRESSURE: 146 MMHG | WEIGHT: 157.6 LBS | RESPIRATION RATE: 16 BRPM | TEMPERATURE: 98.3 F | BODY MASS INDEX: 30.02 KG/M2 | HEART RATE: 84 BPM

## 2021-04-21 DIAGNOSIS — Z23 NEED FOR SHINGLES VACCINE: ICD-10-CM

## 2021-04-21 DIAGNOSIS — I10 ESSENTIAL HYPERTENSION: Primary | ICD-10-CM

## 2021-04-21 DIAGNOSIS — R21 RASH: ICD-10-CM

## 2021-04-21 PROCEDURE — 3078F DIAST BP <80 MM HG: CPT | Performed by: FAMILY MEDICINE

## 2021-04-21 PROCEDURE — 3077F SYST BP >= 140 MM HG: CPT | Performed by: FAMILY MEDICINE

## 2021-04-21 PROCEDURE — 1101F PT FALLS ASSESS-DOCD LE1/YR: CPT | Performed by: FAMILY MEDICINE

## 2021-04-21 PROCEDURE — 1160F RVW MEDS BY RX/DR IN RCRD: CPT | Performed by: FAMILY MEDICINE

## 2021-04-21 PROCEDURE — 99214 OFFICE O/P EST MOD 30 MIN: CPT | Performed by: FAMILY MEDICINE

## 2021-04-21 PROCEDURE — 3288F FALL RISK ASSESSMENT DOCD: CPT | Performed by: FAMILY MEDICINE

## 2021-04-21 PROCEDURE — 3725F SCREEN DEPRESSION PERFORMED: CPT | Performed by: FAMILY MEDICINE

## 2021-04-21 RX ORDER — METOPROLOL SUCCINATE 25 MG/1
25 TABLET, EXTENDED RELEASE ORAL DAILY
Qty: 30 TABLET | Refills: 5 | Status: SHIPPED | OUTPATIENT
Start: 2021-04-21 | End: 2021-10-11

## 2021-04-21 NOTE — PROGRESS NOTES
Ángel Newman is here for BP followoup    see notes at her visit in March  Blood pressure was elevated and amlodipine was added to her lisinopril  She was started on amlodipine  However she got an itchy  rash on her upper body after beingon it for 4-5 days  She would get the rash several hours after taking the amlodipine  She stopped it but still gets the rash  She does not have it at the present time but she describes a very itchy little bumps  She takes Benadryl which does help  No known changes in diet medication etc    she still needs get her blood work done  She had her 1st Shingrix  October 2019  She did not get a 2nd  most likely due to Prema last year  She did have a Zostavax in October 2017  O: /70 (Cuff Size: Standard)   Pulse 84   Temp 98 3 °F (36 8 °C)   Resp 16   Wt 71 5 kg (157 lb 9 6 oz)   BMI 30 02 kg/m²     BP by me 140/78 right arm     CT lung cancer screen shows stable nodules    Assessment  1  HTN-  Suboptimal control  Possible side effects with amlodipine  Will start metoprolol  Side effects reviewed  2  Rash -sounds like this might be urticaria  She will take a picture and send to her chart  May need allergy referral   Continue Benadryl as needed in the meantime   3  Cigarette smoker -stable CT lung cancer screen  4  Health maintenance- have documentation of 1 Zostavax and 1st shin [place]  Will give her 2nd shingles at her visit next month  Plan  Rx for metoprolol  Recheck 1 month

## 2021-04-26 ENCOUNTER — TRANSCRIBE ORDERS (OUTPATIENT)
Dept: ADMINISTRATIVE | Facility: HOSPITAL | Age: 66
End: 2021-04-26

## 2021-04-26 ENCOUNTER — TELEPHONE (OUTPATIENT)
Dept: FAMILY MEDICINE CLINIC | Facility: MEDICAL CENTER | Age: 66
End: 2021-04-26

## 2021-04-26 DIAGNOSIS — E78.00 PURE HYPERCHOLESTEROLEMIA: ICD-10-CM

## 2021-04-26 DIAGNOSIS — Z13.29 THYROID DISORDER SCREENING: ICD-10-CM

## 2021-04-26 DIAGNOSIS — I10 ESSENTIAL HYPERTENSION: Primary | ICD-10-CM

## 2021-04-26 NOTE — TELEPHONE ENCOUNTER
Pt wants to have routine b/w done but she needs new orders  Please, order for patient and we will call pt and let her know  Thank you

## 2021-05-02 DIAGNOSIS — E78.5 DYSLIPIDEMIA: ICD-10-CM

## 2021-05-02 RX ORDER — ATORVASTATIN CALCIUM 10 MG/1
TABLET, FILM COATED ORAL
Qty: 90 TABLET | Refills: 0 | Status: SHIPPED | OUTPATIENT
Start: 2021-05-02 | End: 2021-08-01

## 2021-05-17 ENCOUNTER — APPOINTMENT (OUTPATIENT)
Dept: LAB | Facility: MEDICAL CENTER | Age: 66
End: 2021-05-17
Payer: COMMERCIAL

## 2021-05-17 DIAGNOSIS — Z13.29 THYROID DISORDER SCREENING: ICD-10-CM

## 2021-05-17 DIAGNOSIS — I10 ESSENTIAL HYPERTENSION: ICD-10-CM

## 2021-05-17 DIAGNOSIS — E78.00 PURE HYPERCHOLESTEROLEMIA: ICD-10-CM

## 2021-05-17 LAB
ALBUMIN SERPL BCP-MCNC: 3.7 G/DL (ref 3.5–5)
ALP SERPL-CCNC: 66 U/L (ref 46–116)
ALT SERPL W P-5'-P-CCNC: 28 U/L (ref 12–78)
ANION GAP SERPL CALCULATED.3IONS-SCNC: 6 MMOL/L (ref 4–13)
AST SERPL W P-5'-P-CCNC: 17 U/L (ref 5–45)
BASOPHILS # BLD AUTO: 0.04 THOUSANDS/ΜL (ref 0–0.1)
BASOPHILS NFR BLD AUTO: 1 % (ref 0–1)
BILIRUB SERPL-MCNC: 0.28 MG/DL (ref 0.2–1)
BUN SERPL-MCNC: 17 MG/DL (ref 5–25)
CALCIUM SERPL-MCNC: 9.7 MG/DL (ref 8.3–10.1)
CHLORIDE SERPL-SCNC: 106 MMOL/L (ref 100–108)
CHOLEST SERPL-MCNC: 194 MG/DL (ref 50–200)
CO2 SERPL-SCNC: 27 MMOL/L (ref 21–32)
CREAT SERPL-MCNC: 0.71 MG/DL (ref 0.6–1.3)
EOSINOPHIL # BLD AUTO: 0.12 THOUSAND/ΜL (ref 0–0.61)
EOSINOPHIL NFR BLD AUTO: 2 % (ref 0–6)
ERYTHROCYTE [DISTWIDTH] IN BLOOD BY AUTOMATED COUNT: 13 % (ref 11.6–15.1)
GFR SERPL CREATININE-BSD FRML MDRD: 88 ML/MIN/1.73SQ M
GLUCOSE P FAST SERPL-MCNC: 94 MG/DL (ref 65–99)
HCT VFR BLD AUTO: 42.6 % (ref 34.8–46.1)
HDLC SERPL-MCNC: 79 MG/DL
HGB BLD-MCNC: 14 G/DL (ref 11.5–15.4)
IMM GRANULOCYTES # BLD AUTO: 0.02 THOUSAND/UL (ref 0–0.2)
IMM GRANULOCYTES NFR BLD AUTO: 0 % (ref 0–2)
LDLC SERPL CALC-MCNC: 90 MG/DL (ref 0–100)
LYMPHOCYTES # BLD AUTO: 2.08 THOUSANDS/ΜL (ref 0.6–4.47)
LYMPHOCYTES NFR BLD AUTO: 32 % (ref 14–44)
MCH RBC QN AUTO: 30.3 PG (ref 26.8–34.3)
MCHC RBC AUTO-ENTMCNC: 32.9 G/DL (ref 31.4–37.4)
MCV RBC AUTO: 92 FL (ref 82–98)
MONOCYTES # BLD AUTO: 0.6 THOUSAND/ΜL (ref 0.17–1.22)
MONOCYTES NFR BLD AUTO: 9 % (ref 4–12)
NEUTROPHILS # BLD AUTO: 3.7 THOUSANDS/ΜL (ref 1.85–7.62)
NEUTS SEG NFR BLD AUTO: 56 % (ref 43–75)
NONHDLC SERPL-MCNC: 115 MG/DL
NRBC BLD AUTO-RTO: 0 /100 WBCS
PLATELET # BLD AUTO: 287 THOUSANDS/UL (ref 149–390)
PMV BLD AUTO: 10.2 FL (ref 8.9–12.7)
POTASSIUM SERPL-SCNC: 4.5 MMOL/L (ref 3.5–5.3)
PROT SERPL-MCNC: 7.4 G/DL (ref 6.4–8.2)
RBC # BLD AUTO: 4.62 MILLION/UL (ref 3.81–5.12)
SODIUM SERPL-SCNC: 139 MMOL/L (ref 136–145)
TRIGL SERPL-MCNC: 124 MG/DL
TSH SERPL DL<=0.05 MIU/L-ACNC: 1.15 UIU/ML (ref 0.36–3.74)
WBC # BLD AUTO: 6.56 THOUSAND/UL (ref 4.31–10.16)

## 2021-05-17 PROCEDURE — 84443 ASSAY THYROID STIM HORMONE: CPT

## 2021-05-17 PROCEDURE — 80061 LIPID PANEL: CPT

## 2021-05-17 PROCEDURE — 80053 COMPREHEN METABOLIC PANEL: CPT

## 2021-05-17 PROCEDURE — 36415 COLL VENOUS BLD VENIPUNCTURE: CPT

## 2021-05-17 PROCEDURE — 85025 COMPLETE CBC W/AUTO DIFF WBC: CPT

## 2021-05-19 ENCOUNTER — OFFICE VISIT (OUTPATIENT)
Dept: FAMILY MEDICINE CLINIC | Facility: MEDICAL CENTER | Age: 66
End: 2021-05-19
Payer: COMMERCIAL

## 2021-05-19 VITALS
BODY MASS INDEX: 30.06 KG/M2 | WEIGHT: 157.8 LBS | DIASTOLIC BLOOD PRESSURE: 64 MMHG | TEMPERATURE: 98.3 F | RESPIRATION RATE: 16 BRPM | HEART RATE: 60 BPM | SYSTOLIC BLOOD PRESSURE: 132 MMHG

## 2021-05-19 DIAGNOSIS — Z23 NEED FOR SHINGLES VACCINE: Primary | ICD-10-CM

## 2021-05-19 DIAGNOSIS — I10 ESSENTIAL HYPERTENSION: ICD-10-CM

## 2021-05-19 DIAGNOSIS — R21 RASH: ICD-10-CM

## 2021-05-19 PROCEDURE — 99213 OFFICE O/P EST LOW 20 MIN: CPT | Performed by: FAMILY MEDICINE

## 2021-05-19 PROCEDURE — 3075F SYST BP GE 130 - 139MM HG: CPT | Performed by: FAMILY MEDICINE

## 2021-05-19 PROCEDURE — 90471 IMMUNIZATION ADMIN: CPT | Performed by: FAMILY MEDICINE

## 2021-05-19 PROCEDURE — 90750 HZV VACC RECOMBINANT IM: CPT | Performed by: FAMILY MEDICINE

## 2021-05-19 PROCEDURE — 1160F RVW MEDS BY RX/DR IN RCRD: CPT | Performed by: FAMILY MEDICINE

## 2021-05-19 NOTE — PROGRESS NOTES
Yesenia Avalos is here for blood pressure f/u  See notes last visit  She was started on metoprolol due to possibility of amlodipine induced rash  However she notes that she still gets the rash  She noticed that she got after she got her blood drawn a few days ago in the antecubital fossa area  She also noticed some itching on the right wrist today when she got her pulse taken  Staff today was not wearing any gloves  She uses latex gloves on a daily basis at home  She overall feels that the rash is getting better  She is  Taking metoprolol 25 mg and feeling much better  She can tell when her blood pressure is up and she is not having these flushing feeling agitated sensations  Review of the chart indicates she did not get a 2nd Shingrix which we will be doing today  O: /64 (Cuff Size: Large)   Pulse 60   Temp 98 3 °F (36 8 °C)   Resp 16   Wt 71 6 kg (157 lb 12 8 oz)   BMI 30 06 kg/m²     BP by me 132/68   a few papules are noted over the left antecubital fossa   1 papule is noted over the right wrist volar surface    Assessment   1  Hypertension -good control with use of metoprolol  Will continue  2   Rash- no obvious etiology  Will refer Dermatology if persists  3  Health maintenance -needs 2nd Shingrix today  She is due for a booster Pneumovax which we will given 6 months    Plan   Shingrix    Recheck 6 months with Pneumovax booster at that time

## 2021-06-23 ENCOUNTER — HOSPITAL ENCOUNTER (OUTPATIENT)
Dept: RADIOLOGY | Facility: MEDICAL CENTER | Age: 66
Discharge: HOME | End: 2021-06-23
Payer: COMMERCIAL

## 2021-06-23 VITALS — HEIGHT: 60 IN | BODY MASS INDEX: 30.82 KG/M2 | WEIGHT: 157 LBS

## 2021-06-23 DIAGNOSIS — M85.80 OSTEOPENIA, UNSPECIFIED LOCATION: ICD-10-CM

## 2021-06-23 DIAGNOSIS — Z13.820 SCREENING FOR OSTEOPOROSIS: ICD-10-CM

## 2021-06-23 DIAGNOSIS — Z12.31 VISIT FOR SCREENING MAMMOGRAM: ICD-10-CM

## 2021-06-23 PROCEDURE — 77080 DXA BONE DENSITY AXIAL: CPT

## 2021-06-23 PROCEDURE — 77063 BREAST TOMOSYNTHESIS BI: CPT

## 2021-06-23 PROCEDURE — 77067 SCR MAMMO BI INCL CAD: CPT

## 2021-07-07 ENCOUNTER — TELEPHONE (OUTPATIENT)
Dept: FAMILY MEDICINE CLINIC | Facility: MEDICAL CENTER | Age: 66
End: 2021-07-07

## 2021-07-30 DIAGNOSIS — E78.5 DYSLIPIDEMIA: ICD-10-CM

## 2021-08-01 RX ORDER — ATORVASTATIN CALCIUM 10 MG/1
TABLET, FILM COATED ORAL
Qty: 90 TABLET | Refills: 0 | Status: SHIPPED | OUTPATIENT
Start: 2021-08-01 | End: 2021-10-29

## 2021-08-24 DIAGNOSIS — I10 ESSENTIAL HYPERTENSION: ICD-10-CM

## 2021-08-24 RX ORDER — LISINOPRIL 20 MG/1
TABLET ORAL
Qty: 90 TABLET | Refills: 1 | Status: SHIPPED | OUTPATIENT
Start: 2021-08-24 | End: 2022-02-16

## 2021-10-10 DIAGNOSIS — I10 ESSENTIAL HYPERTENSION: ICD-10-CM

## 2021-10-11 RX ORDER — METOPROLOL SUCCINATE 25 MG/1
TABLET, EXTENDED RELEASE ORAL
Qty: 90 TABLET | Refills: 1 | Status: SHIPPED | OUTPATIENT
Start: 2021-10-11 | End: 2022-02-03 | Stop reason: DRUGHIGH

## 2021-10-26 ENCOUNTER — CLINICAL SUPPORT (OUTPATIENT)
Dept: FAMILY MEDICINE CLINIC | Facility: MEDICAL CENTER | Age: 66
End: 2021-10-26
Payer: COMMERCIAL

## 2021-10-26 DIAGNOSIS — Z23 IMMUNIZATION DUE: Primary | ICD-10-CM

## 2021-10-26 PROCEDURE — 90472 IMMUNIZATION ADMIN EACH ADD: CPT

## 2021-10-26 PROCEDURE — 90471 IMMUNIZATION ADMIN: CPT

## 2021-10-26 PROCEDURE — 90732 PPSV23 VACC 2 YRS+ SUBQ/IM: CPT

## 2021-10-26 PROCEDURE — 90662 IIV NO PRSV INCREASED AG IM: CPT

## 2021-10-29 DIAGNOSIS — E78.5 DYSLIPIDEMIA: ICD-10-CM

## 2021-10-29 RX ORDER — ATORVASTATIN CALCIUM 10 MG/1
TABLET, FILM COATED ORAL
Qty: 90 TABLET | Refills: 0 | Status: SHIPPED | OUTPATIENT
Start: 2021-10-29 | End: 2022-01-21

## 2021-11-24 ENCOUNTER — OFFICE VISIT (OUTPATIENT)
Dept: FAMILY MEDICINE CLINIC | Facility: MEDICAL CENTER | Age: 66
End: 2021-11-24
Payer: COMMERCIAL

## 2021-11-24 VITALS
OXYGEN SATURATION: 97 % | HEART RATE: 64 BPM | WEIGHT: 160 LBS | TEMPERATURE: 99 F | HEIGHT: 60 IN | SYSTOLIC BLOOD PRESSURE: 134 MMHG | DIASTOLIC BLOOD PRESSURE: 76 MMHG | BODY MASS INDEX: 31.41 KG/M2

## 2021-11-24 DIAGNOSIS — E78.00 PURE HYPERCHOLESTEROLEMIA: Primary | ICD-10-CM

## 2021-11-24 DIAGNOSIS — I10 PRIMARY HYPERTENSION: ICD-10-CM

## 2021-11-24 DIAGNOSIS — M85.80 OSTEOPENIA, UNSPECIFIED LOCATION: ICD-10-CM

## 2021-11-24 DIAGNOSIS — R21 RASH: ICD-10-CM

## 2021-11-24 DIAGNOSIS — Z13.29 THYROID DISORDER SCREENING: ICD-10-CM

## 2021-11-24 DIAGNOSIS — I10 ESSENTIAL HYPERTENSION: ICD-10-CM

## 2021-11-24 PROCEDURE — 99214 OFFICE O/P EST MOD 30 MIN: CPT | Performed by: FAMILY MEDICINE

## 2021-11-24 PROCEDURE — 1160F RVW MEDS BY RX/DR IN RCRD: CPT | Performed by: FAMILY MEDICINE

## 2021-11-24 PROCEDURE — 3008F BODY MASS INDEX DOCD: CPT | Performed by: FAMILY MEDICINE

## 2021-11-24 PROCEDURE — 1036F TOBACCO NON-USER: CPT | Performed by: FAMILY MEDICINE

## 2022-01-21 DIAGNOSIS — E78.5 DYSLIPIDEMIA: ICD-10-CM

## 2022-01-21 RX ORDER — ATORVASTATIN CALCIUM 10 MG/1
TABLET, FILM COATED ORAL
Qty: 90 TABLET | Refills: 0 | Status: SHIPPED | OUTPATIENT
Start: 2022-01-21 | End: 2022-04-21

## 2022-01-27 ENCOUNTER — OFFICE VISIT (OUTPATIENT)
Dept: FAMILY MEDICINE CLINIC | Facility: MEDICAL CENTER | Age: 67
End: 2022-01-27
Payer: COMMERCIAL

## 2022-01-27 VITALS
OXYGEN SATURATION: 97 % | DIASTOLIC BLOOD PRESSURE: 72 MMHG | WEIGHT: 157.6 LBS | BODY MASS INDEX: 30.94 KG/M2 | TEMPERATURE: 98.7 F | HEIGHT: 60 IN | SYSTOLIC BLOOD PRESSURE: 148 MMHG | HEART RATE: 67 BPM

## 2022-01-27 DIAGNOSIS — R30.0 BURNING WITH URINATION: ICD-10-CM

## 2022-01-27 DIAGNOSIS — R35.0 FREQUENT URINATION: ICD-10-CM

## 2022-01-27 DIAGNOSIS — R39.89 SUSPECTED UTI: Primary | ICD-10-CM

## 2022-01-27 LAB
SL AMB  POCT GLUCOSE, UA: NORMAL
SL AMB LEUKOCYTE ESTERASE,UA: NORMAL
SL AMB POCT BILIRUBIN,UA: NORMAL
SL AMB POCT BLOOD,UA: NORMAL
SL AMB POCT KETONES,UA: NORMAL
SL AMB POCT NITRITE,UA: NORMAL
SL AMB POCT PH,UA: 6.5
SL AMB POCT SPECIFIC GRAVITY,UA: 1.01
SL AMB POCT URINE PROTEIN: NORMAL
SL AMB POCT UROBILINOGEN: NORMAL

## 2022-01-27 PROCEDURE — 1036F TOBACCO NON-USER: CPT

## 2022-01-27 PROCEDURE — 3008F BODY MASS INDEX DOCD: CPT

## 2022-01-27 PROCEDURE — 1160F RVW MEDS BY RX/DR IN RCRD: CPT

## 2022-01-27 PROCEDURE — 81002 URINALYSIS NONAUTO W/O SCOPE: CPT

## 2022-01-27 PROCEDURE — 87086 URINE CULTURE/COLONY COUNT: CPT

## 2022-01-27 PROCEDURE — 99213 OFFICE O/P EST LOW 20 MIN: CPT

## 2022-01-27 RX ORDER — SULFAMETHOXAZOLE AND TRIMETHOPRIM 800; 160 MG/1; MG/1
1 TABLET ORAL EVERY 12 HOURS SCHEDULED
Qty: 10 TABLET | Refills: 0 | Status: SHIPPED | OUTPATIENT
Start: 2022-01-27 | End: 2022-02-01

## 2022-01-27 NOTE — PROGRESS NOTES
Assessment/Plan:     No problem-specific Assessment & Plan notes found for this encounter  1  Burning with urination  Patient reports slight burning with urination x 5 days  Offered patient Pyridium however declined, does not think symptoms are significant enough at this time to start this medication    - POCT urine dip  - sulfamethoxazole-trimethoprim (BACTRIM DS) 800-160 mg per tablet; Take 1 tablet by mouth every 12 (twelve) hours for 5 days  Dispense: 10 tablet; Refill: 0  - Urine culture; Future  - Urine culture    2  Frequent urination  Patient reports urinary frequency x 5 days    - POCT urine dip  - sulfamethoxazole-trimethoprim (BACTRIM DS) 800-160 mg per tablet; Take 1 tablet by mouth every 12 (twelve) hours for 5 days  Dispense: 10 tablet; Refill: 0  - Urine culture; Future  - Urine culture    3  Suspected UTI  Will start patient on Bactrim x 5 days due to symptoms and reported UTI history  Send urine for culture  Subjective:      Patient ID: Evette Mesa is a 74 y o  female  74year old female presents with complaints of mild urinary burning,  frequency and urgency for 5 days  Reports this feels the same as her previous UTIs  Denies abdominal pain, back pain, pelvic pain  Denies hematuria  Denies vaginal complaints  Reports mild diarrhea due to nerves as she states her  is going through cancer treatments and recently broke his hip, therefore she has a lot going on with him  Urinary Tract Infection   Associated symptoms include frequency and urgency  Pertinent negatives include no chills, flank pain, hematuria, nausea or vomiting  The following portions of the patient's history were reviewed and updated as appropriate: allergies, current medications, past family history, past medical history, past social history, past surgical history and problem list     Review of Systems   Constitutional: Negative for chills and fever     HENT: Negative for ear pain and sore throat  Eyes: Negative for pain and visual disturbance  Respiratory: Negative for cough and shortness of breath  Cardiovascular: Negative for chest pain and palpitations  Gastrointestinal: Positive for diarrhea  Negative for abdominal pain, constipation, nausea and vomiting  Genitourinary: Positive for frequency and urgency  Negative for dysuria, flank pain, hematuria, pelvic pain, vaginal bleeding, vaginal discharge and vaginal pain  Musculoskeletal: Negative for arthralgias and back pain  Skin: Negative for color change and rash  Neurological: Negative for seizures and syncope  All other systems reviewed and are negative  Objective:      /72 (BP Location: Left arm, Patient Position: Sitting, Cuff Size: Adult)   Pulse 67   Temp 98 7 °F (37 1 °C)   Ht 5' (1 524 m)   Wt 71 5 kg (157 lb 9 6 oz)   SpO2 97%   BMI 30 78 kg/m²          Physical Exam  Constitutional:       Appearance: Normal appearance  HENT:      Head: Normocephalic and atraumatic  Cardiovascular:      Rate and Rhythm: Normal rate  Pulses: Normal pulses  Heart sounds: Normal heart sounds  Pulmonary:      Effort: Pulmonary effort is normal       Breath sounds: Normal breath sounds  Abdominal:      General: Abdomen is flat  Bowel sounds are normal       Palpations: Abdomen is soft  Tenderness: There is no right CVA tenderness or left CVA tenderness  Musculoskeletal:         General: Normal range of motion  Cervical back: Normal range of motion and neck supple  Skin:     General: Skin is warm and dry  Neurological:      General: No focal deficit present  Mental Status: She is alert and oriented to person, place, and time     Psychiatric:         Mood and Affect: Mood normal          Behavior: Behavior normal

## 2022-01-27 NOTE — PATIENT INSTRUCTIONS
Take antibiotics as directed  Drink plenty of fluids  Call if symptoms worsen or do not improve  Follow up as needed

## 2022-01-28 LAB — BACTERIA UR CULT: NORMAL

## 2022-02-03 ENCOUNTER — OFFICE VISIT (OUTPATIENT)
Dept: FAMILY MEDICINE CLINIC | Facility: MEDICAL CENTER | Age: 67
End: 2022-02-03
Payer: COMMERCIAL

## 2022-02-03 ENCOUNTER — TELEPHONE (OUTPATIENT)
Dept: FAMILY MEDICINE CLINIC | Facility: MEDICAL CENTER | Age: 67
End: 2022-02-03

## 2022-02-03 VITALS
HEIGHT: 60 IN | BODY MASS INDEX: 30.43 KG/M2 | DIASTOLIC BLOOD PRESSURE: 80 MMHG | TEMPERATURE: 98.7 F | WEIGHT: 155 LBS | HEART RATE: 80 BPM | RESPIRATION RATE: 16 BRPM | SYSTOLIC BLOOD PRESSURE: 144 MMHG

## 2022-02-03 DIAGNOSIS — I10 PRIMARY HYPERTENSION: ICD-10-CM

## 2022-02-03 DIAGNOSIS — R39.89 SUSPECTED UTI: ICD-10-CM

## 2022-02-03 DIAGNOSIS — F41.9 ANXIETY: Primary | ICD-10-CM

## 2022-02-03 PROCEDURE — 1036F TOBACCO NON-USER: CPT | Performed by: FAMILY MEDICINE

## 2022-02-03 PROCEDURE — 1160F RVW MEDS BY RX/DR IN RCRD: CPT | Performed by: FAMILY MEDICINE

## 2022-02-03 PROCEDURE — 99214 OFFICE O/P EST MOD 30 MIN: CPT | Performed by: FAMILY MEDICINE

## 2022-02-03 RX ORDER — METOPROLOL SUCCINATE 50 MG/1
50 TABLET, EXTENDED RELEASE ORAL DAILY
Qty: 30 TABLET | Refills: 2 | Status: SHIPPED | OUTPATIENT
Start: 2022-02-03 | End: 2022-02-24 | Stop reason: SDUPTHER

## 2022-02-03 RX ORDER — LORAZEPAM 1 MG/1
0.5 TABLET ORAL EVERY 8 HOURS PRN
Qty: 30 TABLET | Refills: 1 | Status: SHIPPED | OUTPATIENT
Start: 2022-02-03

## 2022-02-03 RX ORDER — SERTRALINE HYDROCHLORIDE 25 MG/1
25 TABLET, FILM COATED ORAL DAILY
Qty: 30 TABLET | Refills: 5 | Status: SHIPPED | OUTPATIENT
Start: 2022-02-03 | End: 2022-02-24 | Stop reason: SDUPTHER

## 2022-02-03 NOTE — PROGRESS NOTES
Saida Meraz is here for anxiety  She has had anxiety, diarrhea, stressed  Feels like her blood pressure is up  She has been under lot of stress with her 's illness  He is away in [place] for work and broke his hip  This was followed by sepsis and possible renal cancer  She has been [place] to deal [place]  She plans on going down to see him in March  She has been treated with Zoloft many years ago for anxiety which was helpful  Currently she has been using Ativan although only rarely  She did take 1 prior to her visit today  She had a recent urinary tract infection however urine culture was negative  No complaints today    O: /80 (BP Location: Left arm, Patient Position: Sitting, Cuff Size: Adult)   Pulse 80   Temp 98 7 °F (37 1 °C)   Resp 16   Ht 5' (1 524 m)   Wt 70 3 kg (155 lb)   BMI 30 27 kg/m²   BP by me 150/78  Car RRR wihtout murmur  Pulse by me 80 and regular    Assessment  1  Anxiety-will start sertraline; side effects reviewed  May use lorazepam as needed  2  Hypertension-blood pressure is elevated likely due to the above  Will increase the dose of metoprolol to 50 mg   3  Recent urinary symptoms    Plan  As above  Recheck 3 weeks

## 2022-02-03 NOTE — TELEPHONE ENCOUNTER
Patient was given instructions on her medications at checkout:    Start with 25mg of Zoloft daily, if not effective after 1 week, increase to 50mg daily  Take higher dose of metoprolol on Saturdays  Patient said she did not understand this, and did not remember talking about this

## 2022-02-16 DIAGNOSIS — I10 ESSENTIAL HYPERTENSION: ICD-10-CM

## 2022-02-16 RX ORDER — LISINOPRIL 20 MG/1
TABLET ORAL
Qty: 90 TABLET | Refills: 1 | Status: SHIPPED | OUTPATIENT
Start: 2022-02-16

## 2022-02-24 ENCOUNTER — OFFICE VISIT (OUTPATIENT)
Dept: FAMILY MEDICINE CLINIC | Facility: MEDICAL CENTER | Age: 67
End: 2022-02-24
Payer: MEDICARE

## 2022-02-24 VITALS
HEART RATE: 60 BPM | HEIGHT: 60 IN | DIASTOLIC BLOOD PRESSURE: 80 MMHG | OXYGEN SATURATION: 97 % | WEIGHT: 154.4 LBS | BODY MASS INDEX: 30.31 KG/M2 | TEMPERATURE: 99 F | SYSTOLIC BLOOD PRESSURE: 142 MMHG

## 2022-02-24 DIAGNOSIS — F41.9 ANXIETY: ICD-10-CM

## 2022-02-24 DIAGNOSIS — I10 PRIMARY HYPERTENSION: ICD-10-CM

## 2022-02-24 PROCEDURE — 3008F BODY MASS INDEX DOCD: CPT | Performed by: FAMILY MEDICINE

## 2022-02-24 PROCEDURE — 1101F PT FALLS ASSESS-DOCD LE1/YR: CPT | Performed by: FAMILY MEDICINE

## 2022-02-24 PROCEDURE — 99213 OFFICE O/P EST LOW 20 MIN: CPT | Performed by: FAMILY MEDICINE

## 2022-02-24 RX ORDER — SERTRALINE HYDROCHLORIDE 25 MG/1
25 TABLET, FILM COATED ORAL DAILY
Qty: 90 TABLET | Refills: 2 | Status: SHIPPED | OUTPATIENT
Start: 2022-02-24 | End: 2022-03-01 | Stop reason: SDUPTHER

## 2022-02-24 RX ORDER — METOPROLOL SUCCINATE 50 MG/1
50 TABLET, EXTENDED RELEASE ORAL DAILY
Qty: 90 TABLET | Refills: 2 | Status: SHIPPED | OUTPATIENT
Start: 2022-02-24

## 2022-02-24 NOTE — PROGRESS NOTES
Amarjit Gore is here for f/u of anxiety  See notes 2/3  She was started on sertraline and lorazepam as needed  In addition her metoprolol dose was increased to 50 mg  She is sleeping better  Diarrhea better  Taking sertraline and no side effects  She still has stress with husbands medical problems and stay in rehab  Still feels herself worrying constantly  O: /80 (BP Location: Left arm, Patient Position: Sitting, Cuff Size: Adult)   Pulse 60   Temp 99 °F (37 2 °C)   Ht 5' (1 524 m)   Wt 70 kg (154 lb 6 4 oz)   SpO2 97%   BMI 30 15 kg/m²     BP by me 138/70 right  140/70 left    Assessment  1  Anxiety-somewhat improved with sertraline  I did encourage her to increase dose to 50 mg for better effect  Insomnia is improved  2  Hypertension-improved control with it increase metoprolol dose  Plan  As above  She will call with progress

## 2022-03-01 ENCOUNTER — TELEPHONE (OUTPATIENT)
Dept: FAMILY MEDICINE CLINIC | Facility: MEDICAL CENTER | Age: 67
End: 2022-03-01

## 2022-03-01 DIAGNOSIS — F41.9 ANXIETY: ICD-10-CM

## 2022-03-01 NOTE — TELEPHONE ENCOUNTER
Pt said that her sertraline was supposed to be increased to 50mg which it does say in the note  Pt also said Dr Ranjit Patel was going to give her 80 since she is going away  [facility] [place]  Please, check into this  Thank you

## 2022-04-21 DIAGNOSIS — E78.5 DYSLIPIDEMIA: ICD-10-CM

## 2022-04-21 RX ORDER — ATORVASTATIN CALCIUM 10 MG/1
TABLET, FILM COATED ORAL
Qty: 90 TABLET | Refills: 0 | Status: SHIPPED | OUTPATIENT
Start: 2022-04-21 | End: 2022-04-21

## 2022-04-21 RX ORDER — ATORVASTATIN CALCIUM 10 MG/1
TABLET, FILM COATED ORAL
Qty: 90 TABLET | Refills: 0 | Status: SHIPPED | OUTPATIENT
Start: 2022-04-21 | End: 2022-07-18

## 2022-07-18 DIAGNOSIS — E78.5 DYSLIPIDEMIA: ICD-10-CM

## 2022-07-18 DIAGNOSIS — F41.9 ANXIETY: ICD-10-CM

## 2022-07-18 RX ORDER — ATORVASTATIN CALCIUM 10 MG/1
TABLET, FILM COATED ORAL
Qty: 90 TABLET | Refills: 0 | Status: SHIPPED | OUTPATIENT
Start: 2022-07-18 | End: 2022-10-17

## 2022-07-18 NOTE — TELEPHONE ENCOUNTER
Call patient to set up appt with new PCP, then forward to new PCP  If with [facility] , make patient aware she is out of the office for a week

## 2022-10-11 ENCOUNTER — OFFICE VISIT (OUTPATIENT)
Dept: FAMILY MEDICINE CLINIC | Facility: MEDICAL CENTER | Age: 67
End: 2022-10-11
Payer: COMMERCIAL

## 2022-10-11 VITALS
BODY MASS INDEX: 29.96 KG/M2 | HEART RATE: 68 BPM | HEIGHT: 60 IN | DIASTOLIC BLOOD PRESSURE: 78 MMHG | WEIGHT: 152.6 LBS | SYSTOLIC BLOOD PRESSURE: 140 MMHG | OXYGEN SATURATION: 96 %

## 2022-10-11 DIAGNOSIS — Z11.59 NEED FOR HEPATITIS C SCREENING TEST: ICD-10-CM

## 2022-10-11 DIAGNOSIS — Z12.2 ENCOUNTER FOR SCREENING FOR LUNG CANCER: ICD-10-CM

## 2022-10-11 DIAGNOSIS — Z12.31 OTHER SCREENING MAMMOGRAM: Primary | ICD-10-CM

## 2022-10-11 DIAGNOSIS — Z13.29 SCREENING FOR THYROID DISORDER: ICD-10-CM

## 2022-10-11 DIAGNOSIS — I10 PRIMARY HYPERTENSION: ICD-10-CM

## 2022-10-11 DIAGNOSIS — Z63.79 STRESS DUE TO ILLNESS OF FAMILY MEMBER: ICD-10-CM

## 2022-10-11 DIAGNOSIS — Z76.89 ENCOUNTER TO ESTABLISH CARE WITH NEW DOCTOR: Primary | ICD-10-CM

## 2022-10-11 DIAGNOSIS — Z23 IMMUNIZATION DUE: ICD-10-CM

## 2022-10-11 DIAGNOSIS — G56.01 CARPAL TUNNEL SYNDROME, RIGHT: ICD-10-CM

## 2022-10-11 DIAGNOSIS — F17.211 NICOTINE DEPENDENCE, CIGARETTES, IN REMISSION: ICD-10-CM

## 2022-10-11 DIAGNOSIS — E78.00 PURE HYPERCHOLESTEROLEMIA: ICD-10-CM

## 2022-10-11 DIAGNOSIS — Z13.0 SCREENING FOR DEFICIENCY ANEMIA: ICD-10-CM

## 2022-10-11 DIAGNOSIS — F41.9 ANXIETY: ICD-10-CM

## 2022-10-11 PROCEDURE — 90662 IIV NO PRSV INCREASED AG IM: CPT

## 2022-10-11 PROCEDURE — 99214 OFFICE O/P EST MOD 30 MIN: CPT | Performed by: STUDENT IN AN ORGANIZED HEALTH CARE EDUCATION/TRAINING PROGRAM

## 2022-10-11 PROCEDURE — G0008 ADMIN INFLUENZA VIRUS VAC: HCPCS

## 2022-10-11 RX ORDER — ESCITALOPRAM OXALATE 10 MG/1
10 TABLET ORAL DAILY
Qty: 30 TABLET | Refills: 0 | Status: SHIPPED | OUTPATIENT
Start: 2022-10-11 | End: 2022-10-28 | Stop reason: SDUPTHER

## 2022-10-11 NOTE — PROGRESS NOTES
[address] - Clinic Note  [place], 10/11/22     Spike Hurtadotead MRN: [id number] : 1955 Age: 69 y o  Assessment/Plan     1  Encounter to establish care with new doctor    - presents to establish care with new provider and for follow-up  - she does have a few acute concerns today as noted below  - few preventative measures were also addressed at today's office visit, she will return for Medicare annual wellness visit    2  Immunization due    - influenza vaccine, high-dose, PF 0 7 mL (FLUZONE HIGH-DOSE)    3  Need for hepatitis C screening test    - Hepatitis C Antibody ([facility]); Future    4  Encounter for screening for lung cancer    - CT lung screening program; Future    5  Nicotine dependence, cigarettes, in remission    - CT lung screening program; Future    6  Primary hypertension    - continue lisinopril 20 mg p o  daily and metoprolol succinate 50 mg p o  daily  - Comprehensive metabolic panel; Future    7  Pure hypercholesterolemia    - continue Lipitor 10 mg p o  q h s   - Lipid Panel with Direct LDL reflex; Future    8  Screening for thyroid disorder    - TSH, 3rd generation with Free T4 reflex; Future    9  Screening for deficiency anemia    - CBC and differential; Future    10  Anxiety    - patient with heightened anxiousness related to stress due to her 's health complications that are ongoing related to his hip  - strongly suggested that patient establish with counselor  - patient did not tolerate Zoloft due to side effect of diarrhea, will trial Lexapro, patient will call with update  - did not tolerate BuSpar in the past  - will consider SNRI if does not tolerate  - Ambulatory Referral to [facility]; Future    11  Stress due to illness of family member    - Ambulatory Referral to [facility]; Future    12   Carpal tunnel syndrome, right    - advised about conservative measures  - patient will obtain splint use at night  - NSAID p r n  Deuce Vitale acknowledged understanding of treatment plan, all questions answered  Subjective      Deuce Vitale is a 69 y o  female who presents for follow-up and to establish with new provider  Patient unfortunately with increased stress due to her 's health concern  He has had hip surgery and complications thereafter ongoing  Patient is hopeful about a specialist in [place]  This has caused reasonable stress for her  She states she is not tolerating Zoloft due to diarrhea  She states she did tolerated in the past however  Patient mentions that she did not tolerate BusPar either  She mentions her , daughter, and 1 good friend as her social support  States that GERD has improved with lifestyle changes such as not lying supine 4 hours after eating  Patient does have complain today of numbness and tingling 1st 3 digits right upper extremity  Patient states that she is active with activities such as packing  The following portions of the patient's history were reviewed and updated as appropriate: allergies, current medications, past family history, past medical history, past social history, past surgical history and problem list      Past Medical History:   Diagnosis Date   • GERD (gastroesophageal reflux disease)    • Hyperlipidemia    • Hypertension    • UTI (urinary tract infection)        No Known Allergies    Past Surgical History:   Procedure Laterality Date   • MT COLONOSCOPY FLX DX W/COLLJ SPEC WHEN PFRMD N/A 1/24/2018    Procedure: EGD AND COLONOSCOPY;  Surgeon: Rashad Elkins MD;  Location: [facility];   Service: Gastroenterology       Family History   Problem Relation Age of Onset   • No Known Problems Sister    • Heart disease Mother    • Diabetes Father         Diabetes mellitus   • Heart disease Father    • Colon cancer Maternal Aunt    • Colon cancer Maternal Aunt    • No Known Problems Daughter    • No Known Problems Maternal Grandmother    • No Known Problems Maternal Grandfather    • No Known Problems Paternal Grandmother    • No Known Problems Paternal Grandfather    • No Known Problems Brother    • No Known Problems Half-Sister        Social History     Socioeconomic History   • Marital status: /Civil Union     Spouse name: None   • Number of children: None   • Years of education: None   • Highest education level: None   Occupational History   • None   Tobacco Use   • Smoking status: Former Smoker     Packs/day: 1 50     Years: 45 00     Pack years: 67 50     Types: E-Cigarettes, Cigarettes     Quit date: 10/3/2018     Years since quittin 0   • Smokeless tobacco: Former User     Quit date: 11/3/2018   Substance and Sexual Activity   • Alcohol use:  Yes     Alcohol/week: 1 0 standard drink     Types: 1 Glasses of wine per week     Comment: per month; (Never drank alcohol - per [employer])   • Drug use: Not Currently   • Sexual activity: None   Other Topics Concern   • None   Social History Narrative    Caffeine use     Social Determinants of Health     Financial Resource Strain: Not on file   Food Insecurity: Not on file   Transportation Needs: Not on file   Physical Activity: Not on file   Stress: Not on file   Social Connections: Not on file   Intimate Partner Violence: Not on file   Housing Stability: Not on file       Current Outpatient Medications   Medication Sig Dispense Refill   • Ascorbic Acid (VITAMIN C) 100 MG tablet Take 100 mg by mouth daily     • aspirin (ECOTRIN LOW STRENGTH) 81 mg EC tablet Take 81 mg by mouth daily     • atorvastatin (LIPITOR) 10 mg tablet TAKE 1 TABLET BY MOUTH EVERY DAY 90 tablet 0   • calcium-vitamin D 250-100 MG-UNIT per tablet Take 1 tablet by mouth 2 (two) times a day     • co-enzyme Q-10 30 MG capsule Take 30 mg by mouth daily     • Cranberry 1000 MG CAPS Take by mouth     • fluticasone (FLONASE) 50 mcg/act nasal spray 1 spray into each nostril daily     • glucosamine-chondroitin 500-400 MG tablet Take 1 tablet by mouth 3 (three) times a day     • lisinopril (ZESTRIL) 20 mg tablet TAKE 1 TABLET EVERY DAY 90 tablet 1   • LORazepam (ATIVAN) 1 mg tablet Take 0 5 tablets (0 5 mg total) by mouth every 8 (eight) hours as needed for anxiety 30 tablet 1   • metoprolol succinate (TOPROL-XL) 50 mg 24 hr tablet Take 1 tablet (50 mg total) by mouth daily 90 tablet 2   • Multiple Vitamins-Minerals (MULTIVITAMIN WITH MINERALS) tablet Take 1 tablet by mouth daily     • Omega-3 Fatty Acids (FISH OIL) 1,000 mg Take 1,000 mg by mouth daily     • sertraline (ZOLOFT) 50 mg tablet TAKE 1 TABLET BY MOUTH EVERY DAY 90 tablet 0   • vitamin E 100 UNIT capsule Take 100 Units by mouth daily       No current facility-administered medications for this visit  Review of Systems     As noted in HPI    Objective      /78 (BP Location: Left arm, Patient Position: Sitting, Cuff Size: Adult)   Pulse 68   Ht 5' (1 524 m)   Wt 69 2 kg (152 lb 9 6 oz)   SpO2 96%   BMI 29 80 kg/m²     Physical Exam  Vitals reviewed  Constitutional:       General: She is not in acute distress  Appearance: Normal appearance  HENT:      Head: Normocephalic and atraumatic  Nose: Nose normal       Mouth/Throat:      Mouth: Mucous membranes are moist       Pharynx: Oropharynx is clear  Eyes:      Extraocular Movements: Extraocular movements intact  Conjunctiva/sclera: Conjunctivae normal       Pupils: Pupils are equal, round, and reactive to light  Cardiovascular:      Rate and Rhythm: Normal rate and regular rhythm  Pulses: Normal pulses  Heart sounds: Normal heart sounds  Pulmonary:      Effort: Pulmonary effort is normal       Breath sounds: Normal breath sounds  Abdominal:      General: Abdomen is flat  Bowel sounds are normal       Palpations: Abdomen is soft  Tenderness: There is no abdominal tenderness  Musculoskeletal:      Cervical back: Neck supple  Right lower leg: No edema        Left lower leg: No edema  Comments: 5/5 muscle strength bilateral upper extremities, sensation to light touch intact bilateral upper extremities    Positive Phalen's test       Skin:     General: Skin is warm and dry  Neurological:      Mental Status: She is alert and oriented to person, place, and time  Psychiatric:         Mood and Affect: Mood is anxious  Behavior: Behavior normal          Thought Content: Thought content normal          Judgment: Judgment normal              Some portions of this record may have been generated with voice recognition software  There may be translation, syntax, or grammatical errors  Occasional wrong word or "sound-a-like" substitutions may have occurred due to the inherent limitations of the voice recognition software  Read the chart carefully and recognize, using context, where substations may have occurred  If you have any questions, please contact the dictating provider for clarification or correction, as needed

## 2022-10-28 DIAGNOSIS — I10 PRIMARY HYPERTENSION: ICD-10-CM

## 2022-10-28 DIAGNOSIS — I10 ESSENTIAL HYPERTENSION: ICD-10-CM

## 2022-10-28 DIAGNOSIS — F41.9 ANXIETY: ICD-10-CM

## 2022-10-28 RX ORDER — ESCITALOPRAM OXALATE 10 MG/1
10 TABLET ORAL DAILY
Qty: 90 TABLET | Refills: 0 | Status: SHIPPED | OUTPATIENT
Start: 2022-10-28

## 2022-10-28 RX ORDER — LISINOPRIL 20 MG/1
20 TABLET ORAL DAILY
Qty: 90 TABLET | Refills: 1 | Status: SHIPPED | OUTPATIENT
Start: 2022-10-28

## 2022-10-28 RX ORDER — METOPROLOL SUCCINATE 50 MG/1
50 TABLET, EXTENDED RELEASE ORAL DAILY
Qty: 90 TABLET | Refills: 1 | Status: SHIPPED | OUTPATIENT
Start: 2022-10-28

## 2022-10-28 NOTE — TELEPHONE ENCOUNTER
Pt called to request Rxs for escitalopram, lisinopril and metoprolol    [facility] [place]    Routed to Dr Santosh Hassan

## 2022-11-30 ENCOUNTER — APPOINTMENT (OUTPATIENT)
Dept: LAB | Facility: MEDICAL CENTER | Age: 67
End: 2022-11-30

## 2022-11-30 DIAGNOSIS — Z13.0 SCREENING FOR DEFICIENCY ANEMIA: ICD-10-CM

## 2022-11-30 DIAGNOSIS — E78.00 PURE HYPERCHOLESTEROLEMIA: ICD-10-CM

## 2022-11-30 DIAGNOSIS — Z11.59 NEED FOR HEPATITIS C SCREENING TEST: ICD-10-CM

## 2022-11-30 DIAGNOSIS — I10 PRIMARY HYPERTENSION: ICD-10-CM

## 2022-11-30 DIAGNOSIS — Z13.29 SCREENING FOR THYROID DISORDER: ICD-10-CM

## 2022-11-30 LAB
ALBUMIN SERPL BCP-MCNC: 3.5 G/DL (ref 3.5–5)
ALP SERPL-CCNC: 58 U/L (ref 46–116)
ALT SERPL W P-5'-P-CCNC: 26 U/L (ref 12–78)
ANION GAP SERPL CALCULATED.3IONS-SCNC: 8 MMOL/L (ref 4–13)
AST SERPL W P-5'-P-CCNC: 19 U/L (ref 5–45)
BASOPHILS # BLD AUTO: 0.06 THOUSANDS/ÂΜL (ref 0–0.1)
BASOPHILS NFR BLD AUTO: 1 % (ref 0–1)
BILIRUB SERPL-MCNC: 0.36 MG/DL (ref 0.2–1)
BUN SERPL-MCNC: 18 MG/DL (ref 5–25)
CALCIUM SERPL-MCNC: 9 MG/DL (ref 8.3–10.1)
CHLORIDE SERPL-SCNC: 104 MMOL/L (ref 96–108)
CHOLEST SERPL-MCNC: 187 MG/DL
CO2 SERPL-SCNC: 23 MMOL/L (ref 21–32)
CREAT SERPL-MCNC: 0.8 MG/DL (ref 0.6–1.3)
EOSINOPHIL # BLD AUTO: 0.1 THOUSAND/ÂΜL (ref 0–0.61)
EOSINOPHIL NFR BLD AUTO: 2 % (ref 0–6)
ERYTHROCYTE [DISTWIDTH] IN BLOOD BY AUTOMATED COUNT: 12.5 % (ref 11.6–15.1)
GFR SERPL CREATININE-BSD FRML MDRD: 75 ML/MIN/1.73SQ M
GLUCOSE P FAST SERPL-MCNC: 110 MG/DL (ref 65–99)
HCT VFR BLD AUTO: 40.6 % (ref 34.8–46.1)
HCV AB SER QL: NORMAL
HDLC SERPL-MCNC: 76 MG/DL
HGB BLD-MCNC: 13.5 G/DL (ref 11.5–15.4)
IMM GRANULOCYTES # BLD AUTO: 0.01 THOUSAND/UL (ref 0–0.2)
IMM GRANULOCYTES NFR BLD AUTO: 0 % (ref 0–2)
LDLC SERPL CALC-MCNC: 87 MG/DL (ref 0–100)
LYMPHOCYTES # BLD AUTO: 2.22 THOUSANDS/ÂΜL (ref 0.6–4.47)
LYMPHOCYTES NFR BLD AUTO: 37 % (ref 14–44)
MCH RBC QN AUTO: 30.5 PG (ref 26.8–34.3)
MCHC RBC AUTO-ENTMCNC: 33.3 G/DL (ref 31.4–37.4)
MCV RBC AUTO: 92 FL (ref 82–98)
MONOCYTES # BLD AUTO: 0.51 THOUSAND/ÂΜL (ref 0.17–1.22)
MONOCYTES NFR BLD AUTO: 9 % (ref 4–12)
NEUTROPHILS # BLD AUTO: 3.1 THOUSANDS/ÂΜL (ref 1.85–7.62)
NEUTS SEG NFR BLD AUTO: 51 % (ref 43–75)
NRBC BLD AUTO-RTO: 0 /100 WBCS
PLATELET # BLD AUTO: 285 THOUSANDS/UL (ref 149–390)
PMV BLD AUTO: 10 FL (ref 8.9–12.7)
POTASSIUM SERPL-SCNC: 4.2 MMOL/L (ref 3.5–5.3)
PROT SERPL-MCNC: 7.5 G/DL (ref 6.4–8.4)
RBC # BLD AUTO: 4.43 MILLION/UL (ref 3.81–5.12)
SODIUM SERPL-SCNC: 135 MMOL/L (ref 135–147)
TRIGL SERPL-MCNC: 120 MG/DL
TSH SERPL DL<=0.05 MIU/L-ACNC: 0.85 UIU/ML (ref 0.45–4.5)
WBC # BLD AUTO: 6 THOUSAND/UL (ref 4.31–10.16)

## 2022-12-07 ENCOUNTER — TELEPHONE (OUTPATIENT)
Dept: FAMILY MEDICINE CLINIC | Facility: MEDICAL CENTER | Age: 67
End: 2022-12-07

## 2022-12-07 DIAGNOSIS — R19.5 LOOSE BOWEL MOVEMENTS: Primary | ICD-10-CM

## 2022-12-07 NOTE — TELEPHONE ENCOUNTER
Patient aware of lab results, she said that you had changed her to escitalopram from sertraline because she was having bad bouts of diarrhea  She said that she still has diarrhea and there is "no controlling it"  Wants to know if you can put stool testing orders in for her to go to the lab  Or would you prefer to see her in the office?

## 2023-01-27 ENCOUNTER — TELEPHONE (OUTPATIENT)
Dept: PSYCHIATRY | Facility: CLINIC | Age: 68
End: 2023-01-27

## 2023-01-27 DIAGNOSIS — E78.5 DYSLIPIDEMIA: ICD-10-CM

## 2023-01-27 DIAGNOSIS — F41.9 ANXIETY: ICD-10-CM

## 2023-01-27 RX ORDER — ESCITALOPRAM OXALATE 10 MG/1
TABLET ORAL
Qty: 90 TABLET | Refills: 0 | Status: SHIPPED | OUTPATIENT
Start: 2023-01-27

## 2023-01-27 RX ORDER — ATORVASTATIN CALCIUM 10 MG/1
10 TABLET, FILM COATED ORAL DAILY
Qty: 90 TABLET | Refills: 1 | Status: SHIPPED | OUTPATIENT
Start: 2023-01-27

## 2023-01-30 ENCOUNTER — APPOINTMENT (OUTPATIENT)
Dept: LAB | Facility: MEDICAL CENTER | Age: 68
End: 2023-01-30

## 2023-01-30 DIAGNOSIS — R19.5 LOOSE BOWEL MOVEMENTS: ICD-10-CM

## 2023-01-31 ENCOUNTER — APPOINTMENT (OUTPATIENT)
Dept: LAB | Facility: MEDICAL CENTER | Age: 68
End: 2023-01-31

## 2023-01-31 LAB — C DIFF TOX GENS STL QL NAA+PROBE: NEGATIVE

## 2023-02-01 LAB
CAMPYLOBACTER DNA SPEC NAA+PROBE: NORMAL
SALMONELLA DNA SPEC QL NAA+PROBE: NORMAL
SHIGA TOXIN STX GENE SPEC NAA+PROBE: NORMAL
SHIGELLA DNA SPEC QL NAA+PROBE: NORMAL

## 2023-02-02 ENCOUNTER — TELEPHONE (OUTPATIENT)
Dept: FAMILY MEDICINE CLINIC | Facility: MEDICAL CENTER | Age: 68
End: 2023-02-02

## 2023-02-02 DIAGNOSIS — R19.5 LOOSE BOWEL MOVEMENTS: Primary | ICD-10-CM

## 2023-02-02 NOTE — TELEPHONE ENCOUNTER
----- Message from Stephany Blanco DO sent at 2/1/2023  9:07 PM EST -----  Stool cultures negative    If patient still having symptoms, do recommend consultation with GI

## 2023-03-29 ENCOUNTER — TELEPHONE (OUTPATIENT)
Dept: PSYCHIATRY | Facility: CLINIC | Age: 68
End: 2023-03-29

## 2023-04-21 PROBLEM — F33.9 DEPRESSION, RECURRENT (HCC): Status: ACTIVE | Noted: 2023-04-21

## 2023-04-21 PROBLEM — Z00.00 MEDICARE ANNUAL WELLNESS VISIT, SUBSEQUENT: Status: ACTIVE | Noted: 2023-04-21

## 2023-06-20 PROBLEM — Z00.00 MEDICARE ANNUAL WELLNESS VISIT, SUBSEQUENT: Status: RESOLVED | Noted: 2023-04-21 | Resolved: 2023-06-20

## 2023-07-23 DIAGNOSIS — E78.5 DYSLIPIDEMIA: ICD-10-CM

## 2023-07-24 RX ORDER — ATORVASTATIN CALCIUM 10 MG/1
10 TABLET, FILM COATED ORAL DAILY
Qty: 90 TABLET | Refills: 1 | Status: SHIPPED | OUTPATIENT
Start: 2023-07-24

## 2023-07-27 ENCOUNTER — OFFICE VISIT (OUTPATIENT)
Dept: FAMILY MEDICINE CLINIC | Facility: MEDICAL CENTER | Age: 68
End: 2023-07-27
Payer: COMMERCIAL

## 2023-07-27 VITALS
SYSTOLIC BLOOD PRESSURE: 124 MMHG | TEMPERATURE: 97.3 F | WEIGHT: 149.8 LBS | HEIGHT: 60 IN | DIASTOLIC BLOOD PRESSURE: 64 MMHG | BODY MASS INDEX: 29.41 KG/M2 | HEART RATE: 93 BPM | OXYGEN SATURATION: 97 %

## 2023-07-27 DIAGNOSIS — F33.9 DEPRESSION, RECURRENT (HCC): ICD-10-CM

## 2023-07-27 DIAGNOSIS — K21.9 GASTROESOPHAGEAL REFLUX DISEASE, UNSPECIFIED WHETHER ESOPHAGITIS PRESENT: ICD-10-CM

## 2023-07-27 DIAGNOSIS — I10 PRIMARY HYPERTENSION: ICD-10-CM

## 2023-07-27 DIAGNOSIS — R01.1 CARDIAC MURMUR: ICD-10-CM

## 2023-07-27 DIAGNOSIS — E78.00 PURE HYPERCHOLESTEROLEMIA: ICD-10-CM

## 2023-07-27 DIAGNOSIS — Z09 FOLLOW-UP EXAM, 3-6 MONTHS SINCE PREVIOUS EXAM: Primary | ICD-10-CM

## 2023-07-27 PROCEDURE — 99214 OFFICE O/P EST MOD 30 MIN: CPT | Performed by: STUDENT IN AN ORGANIZED HEALTH CARE EDUCATION/TRAINING PROGRAM

## 2023-07-27 RX ORDER — FAMOTIDINE 20 MG/1
20 TABLET, FILM COATED ORAL 2 TIMES DAILY
COMMUNITY

## 2023-07-27 NOTE — PROGRESS NOTES
[address] - Clinic Note  Teresa [place], 23     Bob Stinson MRN: [id number] : 1955 Age: 77 y.o. Assessment/Plan     1. Follow-up exam, 3-6 months since previous exam    -Patient presents for medical follow-up today, she will return in 6 months and sooner as needed     2. Primary hypertension    -Good control current management, continue Toprol and metoprolol succinate at current doses  - Basic metabolic panel; Future    3. Pure hypercholesterolemia    -Continue Lipitor 10 mg p.o. nightly  - Lipid Panel with Direct LDL reflex; Future    4. Cardiac murmur    -Denies any chest pain, shortness of breath or syncope  - Echo complete w/ contrast if indicated; Future    5. Depression, recurrent ([address])    -Stable current management, continue Lexapro 10 mg p.o. daily  PHQ-9 Depression Screening    Little interest or pleasure in doing things: 3 - nearly every day  Feeling down, depressed, or hopeless: 0 - not at all  Trouble falling or staying asleep, or sleeping too much: 0 - not at all  Feeling tired or having little energy: 3 - nearly every day  Poor appetite or overeatin - not at all  Feeling bad about yourself - or that you are a failure or have let yourself or your family down: 0 - not at all  Trouble concentrating on things, such as reading the newspaper or watching television: 0 - not at all  Moving or speaking so slowly that other people could have noticed. Or the opposite - being so fidgety or restless that you have been moving around a lot more than usual: 0 - not at all  Thoughts that you would be better off dead, or of hurting yourself in some way: 0 - not at all  PHQ-9 Score: 6  Score Interpretation: Mild depression         6. Gastroesophageal reflux disease, unspecified whether esophagitis present    -Was managed with [place] Stinson acknowledged understanding of treatment plan, all questions answered.     Letty Stinson is a 77 y.o. female with past medical history including but not limited to hypertension, hyperlipidemia, depression and GERD who presents for medical follow-up. Patient is in good spirits today, she states she has adjusted to situation at home in regards to her family member's health situation regarding hip and related recovery. She denies any chest pain, shortness of breath or syncopal events. The following portions of the patient's history were reviewed and updated as appropriate: allergies, current medications, past family history, past medical history, past social history, past surgical history and problem list.     Past Medical History:   Diagnosis Date   • GERD (gastroesophageal reflux disease)    • Hyperlipidemia    • Hypertension    • UTI (urinary tract infection)        No Known Allergies    Past Surgical History:   Procedure Laterality Date   • AR COLONOSCOPY FLX DX W/COLLJ SPEC WHEN PFRMD N/A 1/24/2018    Procedure: EGD AND COLONOSCOPY;  Surgeon: Rayna Chi MD;  Location: [facility];   Service: Gastroenterology       Family History   Problem Relation Age of Onset   • No Known Problems Sister    • Heart disease Mother    • Diabetes Father         Diabetes mellitus   • Heart disease Father    • Colon cancer Maternal Aunt    • Colon cancer Maternal Aunt    • No Known Problems Daughter    • No Known Problems Maternal Grandmother    • No Known Problems Maternal Grandfather    • No Known Problems Paternal Grandmother    • No Known Problems Paternal Grandfather    • No Known Problems Brother    • No Known Problems Half-Sister        Social History     Socioeconomic History   • Marital status: /Civil Union     Spouse name: None   • Number of children: None   • Years of education: None   • Highest education level: None   Occupational History   • None   Tobacco Use   • Smoking status: Former     Packs/day: 1.50     Years: 45.00     Total pack years: 67.50     Types: E-Cigarettes, Cigarettes     Quit date: 10/3/2018 Years since quittin.8   • Smokeless tobacco: Former     Quit date: 11/3/2018   Substance and Sexual Activity   • Alcohol use: Yes     Alcohol/week: 1.0 standard drink of alcohol     Types: 1 Glasses of wine per week     Comment: per month; (Never drank alcohol - per [employer])   • Drug use: Not Currently   • Sexual activity: None   Other Topics Concern   • None   Social History Narrative    Caffeine use     Social Determinants of Health     Financial Resource Strain: Low Risk  (2023)    Overall Financial Resource Strain (CARDIA)    • Difficulty of Paying Living Expenses: Not hard at all   Food Insecurity: Not on file   Transportation Needs: No Transportation Needs (2023)    PRAPARE - Transportation    • Lack of Transportation (Medical): No    • Lack of Transportation (Non-Medical):  No   Physical Activity: Not on file   Stress: Not on file   Social Connections: Not on file   Intimate Partner Violence: Not on file   Housing Stability: Not on file       Current Outpatient Medications   Medication Sig Dispense Refill   • Ascorbic Acid (VITAMIN C) 100 MG tablet Take 100 mg by mouth daily     • aspirin (ECOTRIN LOW STRENGTH) 81 mg EC tablet Take 81 mg by mouth daily     • atorvastatin (LIPITOR) 10 mg tablet TAKE 1 TABLET BY MOUTH EVERY DAY 90 tablet 1   • calcium-vitamin D 250-100 MG-UNIT per tablet Take 1 tablet by mouth 2 (two) times a day     • co-enzyme Q-10 30 MG capsule Take 30 mg by mouth daily     • Cranberry 1000 MG CAPS Take by mouth     • escitalopram (LEXAPRO) 10 mg tablet Take 1 tablet (10 mg total) by mouth daily 90 tablet 1   • fluticasone (FLONASE) 50 mcg/act nasal spray 1 spray into each nostril daily     • glucosamine-chondroitin 500-400 MG tablet Take 1 tablet by mouth 3 (three) times a day     • lisinopril (ZESTRIL) 20 mg tablet Take 1 tablet (20 mg total) by mouth daily 90 tablet 1   • LORazepam (ATIVAN) 1 mg tablet Take 0.5 tablets (0.5 mg total) by mouth every 8 (eight) hours as needed for anxiety 30 tablet 0   • metoprolol succinate (TOPROL-XL) 50 mg 24 hr tablet Take 1 tablet (50 mg total) by mouth daily 90 tablet 1   • Multiple Vitamins-Minerals (MULTIVITAMIN WITH MINERALS) tablet Take 1 tablet by mouth daily     • Omega-3 Fatty Acids (FISH OIL) 1,000 mg Take 1,000 mg by mouth daily     • vitamin E 100 UNIT capsule Take 100 Units by mouth daily       No current facility-administered medications for this visit. Review of Systems     As noted in HPI    Objective      /64 (BP Location: Left arm, Patient Position: Sitting, Cuff Size: Large)   Pulse 93   Temp (!) 97.3 °F (36.3 °C)   Ht 5' (1.524 m)   Wt 67.9 kg (149 lb 12.8 oz)   SpO2 97%   BMI 29.26 kg/m²     Physical Exam  Vitals reviewed. Constitutional:       General: She is not in acute distress. Appearance: Normal appearance. HENT:      Head: Normocephalic and atraumatic. Right Ear: External ear normal.      Left Ear: External ear normal.      Nose: Nose normal.      Mouth/Throat:      Mouth: Mucous membranes are moist.      Pharynx: Oropharynx is clear. Eyes:      Extraocular Movements: Extraocular movements intact. Conjunctiva/sclera: Conjunctivae normal.      Pupils: Pupils are equal, round, and reactive to light. Neck:      Thyroid: No thyroid tenderness. Cardiovascular:      Rate and Rhythm: Normal rate and regular rhythm. Pulses: Normal pulses. Heart sounds: Murmur heard. Pulmonary:      Effort: Pulmonary effort is normal.      Breath sounds: Normal breath sounds. Abdominal:      General: Abdomen is flat. Bowel sounds are normal.      Palpations: Abdomen is soft. Tenderness: There is no abdominal tenderness. Musculoskeletal:      Cervical back: Neck supple. Right lower leg: No edema. Left lower leg: No edema. Skin:     General: Skin is warm and dry. Neurological:      Mental Status: She is alert and oriented to person, place, and time.    Psychiatric: Mood and Affect: Mood normal.         Behavior: Behavior normal.         Thought Content: Thought content normal.         Judgment: Judgment normal.             Some portions of this record may have been generated with voice recognition software. There may be translation, syntax, or grammatical errors. Occasional wrong word or "sound-a-like" substitutions may have occurred due to the inherent limitations of the voice recognition software. Read the chart carefully and recognize, using context, where substations may have occurred. If you have any questions, please contact the dictating provider for clarification or correction, as needed.

## 2023-08-04 ENCOUNTER — APPOINTMENT (OUTPATIENT)
Dept: LAB | Facility: MEDICAL CENTER | Age: 68
End: 2023-08-04
Payer: COMMERCIAL

## 2023-08-04 DIAGNOSIS — I10 PRIMARY HYPERTENSION: ICD-10-CM

## 2023-08-04 LAB
ALBUMIN SERPL BCP-MCNC: 3.6 G/DL (ref 3.5–5)
ALP SERPL-CCNC: 57 U/L (ref 46–116)
ALT SERPL W P-5'-P-CCNC: 21 U/L (ref 12–78)
ANION GAP SERPL CALCULATED.3IONS-SCNC: 4 MMOL/L
AST SERPL W P-5'-P-CCNC: 12 U/L (ref 5–45)
BILIRUB SERPL-MCNC: 0.35 MG/DL (ref 0.2–1)
BUN SERPL-MCNC: 15 MG/DL (ref 5–25)
CALCIUM SERPL-MCNC: 8.8 MG/DL (ref 8.3–10.1)
CHLORIDE SERPL-SCNC: 106 MMOL/L (ref 96–108)
CO2 SERPL-SCNC: 28 MMOL/L (ref 21–32)
CREAT SERPL-MCNC: 0.86 MG/DL (ref 0.6–1.3)
GFR SERPL CREATININE-BSD FRML MDRD: 68 ML/MIN/1.73SQ M
GLUCOSE P FAST SERPL-MCNC: 98 MG/DL (ref 65–99)
POTASSIUM SERPL-SCNC: 4 MMOL/L (ref 3.5–5.3)
PROT SERPL-MCNC: 7 G/DL (ref 6.4–8.4)
SODIUM SERPL-SCNC: 138 MMOL/L (ref 135–147)

## 2023-08-04 PROCEDURE — 36415 COLL VENOUS BLD VENIPUNCTURE: CPT

## 2023-08-04 PROCEDURE — 80053 COMPREHEN METABOLIC PANEL: CPT

## 2023-08-29 ENCOUNTER — HOSPITAL ENCOUNTER (OUTPATIENT)
Dept: NON INVASIVE DIAGNOSTICS | Facility: MEDICAL CENTER | Age: 68
Discharge: HOME | End: 2023-08-29
Payer: COMMERCIAL

## 2023-08-29 VITALS
HEIGHT: 60 IN | SYSTOLIC BLOOD PRESSURE: 124 MMHG | DIASTOLIC BLOOD PRESSURE: 64 MMHG | HEART RATE: 93 BPM | BODY MASS INDEX: 29.25 KG/M2 | WEIGHT: 149 LBS

## 2023-08-29 DIAGNOSIS — R01.1 CARDIAC MURMUR: ICD-10-CM

## 2023-08-29 LAB
AORTIC ROOT: 2.9 CM
APICAL FOUR CHAMBER EJECTION FRACTION: 76 %
ASCENDING AORTA: 3 CM
E WAVE DECELERATION TIME: 231 MS
FRACTIONAL SHORTENING: 32 % (ref 28–44)
INTERVENTRICULAR SEPTUM IN DIASTOLE (PARASTERNAL SHORT AXIS VIEW): 1 CM
INTERVENTRICULAR SEPTUM: 1 CM (ref 0.6–1.1)
LAAS-AP2: 19.3 CM2
LAAS-AP4: 20 CM2
LEFT ATRIUM SIZE: 3.7 CM
LEFT ATRIUM VOLUME (MOD BIPLANE): 58 ML
LEFT INTERNAL DIMENSION IN SYSTOLE: 3 CM (ref 2.1–4)
LEFT VENTRICULAR INTERNAL DIMENSION IN DIASTOLE: 4.4 CM (ref 3.5–6)
LEFT VENTRICULAR POSTERIOR WALL IN END DIASTOLE: 0.8 CM
LEFT VENTRICULAR STROKE VOLUME: 50 ML
LVSV (TEICH): 50 ML
MV E'TISSUE VEL-SEP: 8 CM/S
MV PEAK A VEL: 0.72 M/S
MV PEAK E VEL: 92 CM/S
MV STENOSIS PRESSURE HALF TIME: 67 MS
MV VALVE AREA P 1/2 METHOD: 3.28 CM2
RIGHT ATRIUM AREA SYSTOLE A4C: 12.3 CM2
RIGHT VENTRICLE ID DIMENSION: 3.5 CM
SL CV LEFT ATRIUM LENGTH A2C: 5.6 CM
SL CV PED ECHO LEFT VENTRICLE DIASTOLIC VOLUME (MOD BIPLANE) 2D: 86 ML
SL CV PED ECHO LEFT VENTRICLE SYSTOLIC VOLUME (MOD BIPLANE) 2D: 36 ML
TR MAX PG: 27 MMHG
TR PEAK VELOCITY: 2.6 M/S
TRICUSPID ANNULAR PLANE SYSTOLIC EXCURSION: 2 CM
TRICUSPID VALVE PEAK REGURGITATION VELOCITY: 2.62 M/S

## 2023-08-29 PROCEDURE — 93306 TTE W/DOPPLER COMPLETE: CPT | Performed by: INTERNAL MEDICINE

## 2023-08-29 PROCEDURE — 93306 TTE W/DOPPLER COMPLETE: CPT

## 2023-08-30 ENCOUNTER — TELEPHONE (OUTPATIENT)
Dept: FAMILY MEDICINE CLINIC | Facility: MEDICAL CENTER | Age: 68
End: 2023-08-30

## 2023-08-30 NOTE — TELEPHONE ENCOUNTER
----- Message from Jesi Victoria DO sent at 8/30/2023  8:31 AM EDT -----  Please inform patient that echocardiogram shows normal feeling and pumping out blood from heart. There is mild dilatation of a chamber of the heart. There is mild backflow of the mitral valve and tricuspid valve. No marked abnormalities.

## 2023-10-17 DIAGNOSIS — I10 ESSENTIAL HYPERTENSION: ICD-10-CM

## 2023-10-17 DIAGNOSIS — I10 PRIMARY HYPERTENSION: ICD-10-CM

## 2023-10-17 DIAGNOSIS — F41.9 ANXIETY: ICD-10-CM

## 2023-10-17 RX ORDER — LISINOPRIL 20 MG/1
20 TABLET ORAL DAILY
Qty: 90 TABLET | Refills: 1 | Status: SHIPPED | OUTPATIENT
Start: 2023-10-17

## 2023-10-17 RX ORDER — ESCITALOPRAM OXALATE 10 MG/1
10 TABLET ORAL DAILY
Qty: 90 TABLET | Refills: 1 | Status: SHIPPED | OUTPATIENT
Start: 2023-10-17

## 2023-10-17 RX ORDER — METOPROLOL SUCCINATE 50 MG/1
50 TABLET, EXTENDED RELEASE ORAL DAILY
Qty: 90 TABLET | Refills: 1 | Status: SHIPPED | OUTPATIENT
Start: 2023-10-17

## 2023-11-03 ENCOUNTER — IMMUNIZATIONS (OUTPATIENT)
Dept: FAMILY MEDICINE CLINIC | Facility: MEDICAL CENTER | Age: 68
End: 2023-11-03
Payer: COMMERCIAL

## 2023-11-03 DIAGNOSIS — Z23 ENCOUNTER FOR IMMUNIZATION: Primary | ICD-10-CM

## 2023-11-03 PROCEDURE — 90662 IIV NO PRSV INCREASED AG IM: CPT

## 2023-11-03 PROCEDURE — G0008 ADMIN INFLUENZA VIRUS VAC: HCPCS

## 2023-12-04 ENCOUNTER — HOSPITAL ENCOUNTER (OUTPATIENT)
Dept: RADIOLOGY | Facility: MEDICAL CENTER | Age: 68
Discharge: HOME | End: 2023-12-04
Payer: COMMERCIAL

## 2023-12-04 VITALS — BODY MASS INDEX: 29.25 KG/M2 | WEIGHT: 149 LBS | HEIGHT: 60 IN

## 2023-12-04 DIAGNOSIS — Z12.2 ENCOUNTER FOR SCREENING FOR LUNG CANCER: ICD-10-CM

## 2023-12-04 DIAGNOSIS — M85.88 OSTEOPENIA OF OTHER SITE: ICD-10-CM

## 2023-12-04 DIAGNOSIS — Z12.31 ENCOUNTER FOR SCREENING MAMMOGRAM FOR MALIGNANT NEOPLASM OF BREAST: ICD-10-CM

## 2023-12-04 PROCEDURE — 77063 BREAST TOMOSYNTHESIS BI: CPT

## 2023-12-04 PROCEDURE — 77080 DXA BONE DENSITY AXIAL: CPT

## 2023-12-04 PROCEDURE — 71271 CT THORAX LUNG CANCER SCR C-: CPT

## 2023-12-04 PROCEDURE — 77067 SCR MAMMO BI INCL CAD: CPT

## 2023-12-08 ENCOUNTER — TELEPHONE (OUTPATIENT)
Dept: FAMILY MEDICINE CLINIC | Facility: MEDICAL CENTER | Age: 68
End: 2023-12-08

## 2023-12-08 DIAGNOSIS — R91.8 LUNG NODULES: ICD-10-CM

## 2023-12-08 DIAGNOSIS — Z12.2 SCREENING FOR LUNG CANCER: Primary | ICD-10-CM

## 2023-12-08 NOTE — TELEPHONE ENCOUNTER
----- Message from Jose Pringle DO sent at 12/8/2023  3:13 PM EST -----  Please inform patient that CT lung scan shows stable lung nodules. No new or suspicious nodules. Repeat screening in 1 year, order placed.

## 2023-12-29 ENCOUNTER — NURSE TRIAGE (OUTPATIENT)
Age: 68
End: 2023-12-29

## 2023-12-29 ENCOUNTER — APPOINTMENT (EMERGENCY)
Dept: RADIOLOGY | Facility: HOSPITAL | Age: 68
End: 2023-12-29
Payer: COMMERCIAL

## 2023-12-29 ENCOUNTER — HOSPITAL ENCOUNTER (EMERGENCY)
Facility: HOSPITAL | Age: 68
Discharge: HOME | End: 2023-12-29
Attending: STUDENT IN AN ORGANIZED HEALTH CARE EDUCATION/TRAINING PROGRAM
Payer: COMMERCIAL

## 2023-12-29 VITALS
TEMPERATURE: 98.3 F | OXYGEN SATURATION: 98 % | DIASTOLIC BLOOD PRESSURE: 83 MMHG | SYSTOLIC BLOOD PRESSURE: 195 MMHG | HEART RATE: 53 BPM | RESPIRATION RATE: 16 BRPM
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
## 2023-12-29 DIAGNOSIS — S20.211A RIB CONTUSION, RIGHT, INITIAL ENCOUNTER: Primary | ICD-10-CM

## 2023-12-29 DIAGNOSIS — I10 HIGH BLOOD PRESSURE: ICD-10-CM

## 2023-12-29 LAB
ALBUMIN SERPL BCP-MCNC: 4.3 G/DL (ref 3.5–5)
ALP SERPL-CCNC: 57 U/L (ref 34–104)
ALT SERPL W P-5'-P-CCNC: 16 U/L (ref 7–52)
ANION GAP SERPL CALCULATED.3IONS-SCNC: 6 MMOL/L
AST SERPL W P-5'-P-CCNC: 16 U/L (ref 13–39)
ATRIAL RATE: 55 BPM
BASOPHILS # BLD AUTO: 0.05 THOUSANDS/ÂΜL (ref 0–0.1)
BASOPHILS NFR BLD AUTO: 1 % (ref 0–1)
BILIRUB SERPL-MCNC: 0.36 MG/DL (ref 0.2–1)
BUN SERPL-MCNC: 23 MG/DL (ref 5–25)
CALCIUM SERPL-MCNC: 10.3 MG/DL (ref 8.4–10.2)
CARDIAC TROPONIN I PNL SERPL HS: 4 NG/L
CHLORIDE SERPL-SCNC: 100 MMOL/L (ref 96–108)
CO2 SERPL-SCNC: 28 MMOL/L (ref 21–32)
CREAT SERPL-MCNC: 0.91 MG/DL (ref 0.6–1.3)
EOSINOPHIL # BLD AUTO: 0.05 THOUSAND/ÂΜL (ref 0–0.61)
EOSINOPHIL NFR BLD AUTO: 1 % (ref 0–6)
ERYTHROCYTE [DISTWIDTH] IN BLOOD BY AUTOMATED COUNT: 12.8 % (ref 11.6–15.1)
GFR SERPL CREATININE-BSD FRML MDRD: 64 ML/MIN/1.73SQ M
GLUCOSE SERPL-MCNC: 88 MG/DL (ref 65–140)
HCT VFR BLD AUTO: 40.9 % (ref 34.8–46.1)
HGB BLD-MCNC: 13.7 G/DL (ref 11.5–15.4)
IMM GRANULOCYTES # BLD AUTO: 0.03 THOUSAND/UL (ref 0–0.2)
IMM GRANULOCYTES NFR BLD AUTO: 0 % (ref 0–2)
LYMPHOCYTES # BLD AUTO: 2 THOUSANDS/ÂΜL (ref 0.6–4.47)
LYMPHOCYTES NFR BLD AUTO: 25 % (ref 14–44)
MCH RBC QN AUTO: 30.6 PG (ref 26.8–34.3)
MCHC RBC AUTO-ENTMCNC: 33.5 G/DL (ref 31.4–37.4)
MCV RBC AUTO: 91 FL (ref 82–98)
MONOCYTES # BLD AUTO: 0.65 THOUSAND/ÂΜL (ref 0.17–1.22)
MONOCYTES NFR BLD AUTO: 8 % (ref 4–12)
NEUTROPHILS # BLD AUTO: 5.13 THOUSANDS/ÂΜL (ref 1.85–7.62)
NEUTS SEG NFR BLD AUTO: 65 % (ref 43–75)
NRBC BLD AUTO-RTO: 0 /100 WBCS
P AXIS: 78 DEGREES
PLATELET # BLD AUTO: 271 THOUSANDS/UL (ref 149–390)
PMV BLD AUTO: 9.3 FL (ref 8.9–12.7)
POTASSIUM SERPL-SCNC: 4.6 MMOL/L (ref 3.5–5.3)
PR INTERVAL: 128 MS
PROT SERPL-MCNC: 7.3 G/DL (ref 6.4–8.4)
QRS AXIS: 85 DEGREES
QRSD INTERVAL: 90 MS
QT INTERVAL: 448 MS
QTC INTERVAL: 428 MS
RBC # BLD AUTO: 4.48 MILLION/UL (ref 3.81–5.12)
SODIUM SERPL-SCNC: 134 MMOL/L (ref 135–147)
T WAVE AXIS: 62 DEGREES
VENTRICULAR RATE: 55 BPM
WBC # BLD AUTO: 7.91 THOUSAND/UL (ref 4.31–10.16)

## 2023-12-29 PROCEDURE — 85025 COMPLETE CBC W/AUTO DIFF WBC: CPT | Performed by: STUDENT IN AN ORGANIZED HEALTH CARE EDUCATION/TRAINING PROGRAM

## 2023-12-29 PROCEDURE — 84484 ASSAY OF TROPONIN QUANT: CPT | Performed by: STUDENT IN AN ORGANIZED HEALTH CARE EDUCATION/TRAINING PROGRAM

## 2023-12-29 PROCEDURE — 96374 THER/PROPH/DIAG INJ IV PUSH: CPT

## 2023-12-29 PROCEDURE — 71045 X-RAY EXAM CHEST 1 VIEW: CPT

## 2023-12-29 PROCEDURE — 93005 ELECTROCARDIOGRAM TRACING: CPT

## 2023-12-29 PROCEDURE — 36415 COLL VENOUS BLD VENIPUNCTURE: CPT

## 2023-12-29 PROCEDURE — 80053 COMPREHEN METABOLIC PANEL: CPT | Performed by: STUDENT IN AN ORGANIZED HEALTH CARE EDUCATION/TRAINING PROGRAM

## 2023-12-29 PROCEDURE — 99284 EMERGENCY DEPT VISIT MOD MDM: CPT

## 2023-12-29 RX ORDER — OXYCODONE HYDROCHLORIDE 5 MG/1
5 TABLET ORAL EVERY 6 HOURS PRN
Qty: 10 TABLET | Refills: 0 | Status: SHIPPED | OUTPATIENT
Start: 2023-12-29 | End: 2024-01-08

## 2023-12-29 RX ORDER — NAPROXEN 375 MG/1
375 TABLET ORAL 2 TIMES DAILY WITH MEALS
Qty: 20 TABLET | Refills: 0 | Status: SHIPPED | OUTPATIENT
Start: 2023-12-29

## 2023-12-29 RX ORDER — ACETAMINOPHEN 325 MG/1
650 TABLET ORAL ONCE
Status: COMPLETED | OUTPATIENT
Start: 2023-12-29 | End: 2023-12-29

## 2023-12-29 RX ORDER — KETOROLAC TROMETHAMINE 30 MG/ML
15 INJECTION, SOLUTION INTRAMUSCULAR; INTRAVENOUS ONCE
Status: COMPLETED | OUTPATIENT
Start: 2023-12-29 | End: 2023-12-29

## 2023-12-29 RX ADMIN — KETOROLAC TROMETHAMINE 15 MG: 30 INJECTION, SOLUTION INTRAMUSCULAR; INTRAVENOUS at 17:02

## 2023-12-29 RX ADMIN — ACETAMINOPHEN 650 MG: 325 TABLET, FILM COATED ORAL at 17:01

## 2023-12-29 NOTE — TELEPHONE ENCOUNTER
I strongly suggest that patient be evaluated given her description of severe symptoms as well.  She can have a full evaluation and appropriate imaging can be ordered and reviewed on a timely basis.

## 2023-12-29 NOTE — TELEPHONE ENCOUNTER
Left a voicemail again for the patient to call back.     Please tell the patient that Dr. Yang agrees, she needs to go to the ER for evaluation and imaging. (See message below.) Please do not transfer back to the office as nobody is here at the desk majority of the day to take calls and the patient should not wait.

## 2023-12-29 NOTE — TELEPHONE ENCOUNTER
Pt called back, tried reaching out to the office as pt requested. Could not get through, system froze and I was unable to take the pt off of hold or disconnect with the pending jessica to the office. Please advise patient.

## 2023-12-29 NOTE — TELEPHONE ENCOUNTER
"Patient called in to report severe right rib pain post fall on 12/24/23 down one step onto hardwood surface. Patient landed on right side with impact to shoulder, arm, hip and leg. Reported right arm pain that has resolved. Right rib pain continues and is severe. Patient advised to go to ER and declined; prefers PCP order x-ray so she can go to site next door to office. Please follow up with patient    Reason for Disposition   Caller has URGENT question and triager unable to answer question    Answer Assessment - Initial Assessment Questions  1. MECHANISM: \"How did the fall happen?\"      Tripped inside the house onto wooden floor  2. DOMESTIC VIOLENCE AND ELDER ABUSE SCREENING: \"Did you fall because someone pushed you or tried to hurt you?\" If Yes, ask: \"Are you safe now?\"      Denies  3. ONSET: \"When did the fall happen?\" (e.g., minutes, hours, or days ago)      12/24/23  4. LOCATION: \"What part of the body hit the ground?\" (e.g., back, buttocks, head, hips, knees, hands, head, stomach)      Right side shoulder, arm, hip, and leg.   5. INJURY: \"Did you hurt (injure) yourself when you fell?\" If Yes, ask: \"What did you injure? Tell me more about this?\" (e.g., body area; type of injury; pain severity)\"      Right arm pain resolved. Rib pain on right side is worsening  6. PAIN: \"Is there any pain?\" If Yes, ask: \"How bad is the pain?\" (e.g., Scale 1-10; or mild,   moderate, severe)    - NONE (0): no pain    - MILD (1-3): doesn't interfere with normal activities     - MODERATE (4-7): interferes with normal activities or awakens from sleep     - SEVERE (8-10): excruciating pain, unable to do any normal activities       Severe (10)  7. SIZE: For cuts, bruises, or swelling, ask: \"How large is it?\" (e.g., inches or centimeters)       Denies  8. PREGNANCY: \"Is there any chance you are pregnant?\" \"When was your last menstrual period?\"      Post menopausal  9. OTHER SYMPTOMS: \"Do you have any other symptoms?\" (e.g., dizziness, " "fever, weakness; new onset or worsening).       Worsening right rib pain  10. CAUSE: \"What do you think caused the fall (or falling)?\" (e.g., tripped, dizzy spell)        Denies; just tripped    Protocols used: Falls and Falling-ADULT-[place]    "

## 2023-12-29 NOTE — TELEPHONE ENCOUNTER
Left a voicemail for the patient to call back. Please relay the message and/or results below when the patient returns the call.  Thank you!     Please relay message from Dr. Yang:  I strongly suggest that patient be evaluated given her description of severe symptoms as well.  She can have a full evaluation and appropriate imaging can be ordered and reviewed on a timely basis.

## 2024-01-03 NOTE — ED PROVIDER NOTES
History  Chief Complaint   Patient presents with    Fall     Fell on [employer]. No headstrike. Fell on r side. C/o rib pain worsening. Able to take deep breath but c/o pain. +aspirin    Rib Pain     68 year old female presenting today after falling on [employer], complaining of right rib/chest pain. Tender when she presses on it.  Denies any head strike.  She does take aspirin however she does not take any more blood thinners.  States that when she takes a deep breath and it does hurt more.  States that she fell on her right side which is consistent with the pain.  Denies any nausea, vomiting, diarrhea, constipation, abdominal pain, back pain, or movement of this pain.  Neck or jaw involvement.        Prior to Admission Medications   Prescriptions Last Dose Informant Patient Reported? Taking?   Ascorbic Acid (VITAMIN C) 100 MG tablet   Yes No   Sig: Take 100 mg by mouth daily   Cranberry 1000 MG CAPS   Yes No   Sig: Take by mouth   LORazepam (ATIVAN) 1 mg tablet   No No   Sig: Take 0.5 tablets (0.5 mg total) by mouth every 8 (eight) hours as needed for anxiety   Multiple Vitamins-Minerals (MULTIVITAMIN WITH MINERALS) tablet   Yes No   Sig: Take 1 tablet by mouth daily   Omega-3 Fatty Acids (FISH OIL) 1,000 mg   Yes No   Sig: Take 1,000 mg by mouth daily   aspirin (ECOTRIN LOW STRENGTH) 81 mg EC tablet   Yes No   Sig: Take 81 mg by mouth daily   atorvastatin (LIPITOR) 10 mg tablet   No No   Sig: TAKE 1 TABLET BY MOUTH EVERY DAY   calcium-vitamin D 250-100 MG-UNIT per tablet   Yes No   Sig: Take 1 tablet by mouth 2 (two) times a day   co-enzyme Q-10 30 MG capsule   Yes No   Sig: Take 30 mg by mouth daily   escitalopram (LEXAPRO) 10 mg tablet   No No   Sig: TAKE 1 TABLET BY MOUTH EVERY DAY   famotidine (PEPCID) 20 mg tablet   Yes No   Sig: Take 20 mg by mouth 2 (two) times a day   fluticasone (FLONASE) 50 mcg/act nasal spray   Yes No   Si spray into each nostril daily   glucosamine-chondroitin 500-400 MG  tablet   Yes No   Sig: Take 1 tablet by mouth 3 (three) times a day   lisinopril (ZESTRIL) 20 mg tablet   No No   Sig: TAKE 1 TABLET BY MOUTH EVERY DAY   metoprolol succinate (TOPROL-XL) 50 mg 24 hr tablet   No No   Sig: TAKE 1 TABLET BY MOUTH EVERY DAY   vitamin E 100 UNIT capsule   Yes No   Sig: Take 100 Units by mouth daily      Facility-Administered Medications: None       Past Medical History:   Diagnosis Date    GERD (gastroesophageal reflux disease)     Hyperlipidemia     Hypertension     UTI (urinary tract infection)        Past Surgical History:   Procedure Laterality Date    DE COLONOSCOPY FLX DX W/COLLJ SPEC WHEN PFRMD N/A 2018    Procedure: EGD AND COLONOSCOPY;  Surgeon: Valerie Swenson MD;  [facility];  Service: Gastroenterology       Family History   Problem Relation Age of Onset    No Known Problems Sister     Heart disease Mother     Diabetes Father         Diabetes mellitus    Heart disease Father     Colon cancer Maternal Aunt     Colon cancer Maternal Aunt     No Known Problems Daughter     No Known Problems Maternal Grandmother     No Known Problems Maternal Grandfather     No Known Problems Paternal Grandmother     No Known Problems Paternal Grandfather     No Known Problems Brother     No Known Problems Half-Sister      I have reviewed and agree with the history as documented.    E-Cigarette/Vaping     E-Cigarette/Vaping Substances     Social History     Tobacco Use    Smoking status: Former     Current packs/day: 0.00     Average packs/day: 1.5 packs/day for 45.0 years (67.5 ttl pk-yrs)     Types: E-Cigarettes, Cigarettes     Start date: 10/3/1973     Quit date: 10/3/2018     Years since quittin.2    Smokeless tobacco: Former     Quit date: 11/3/2018   Substance Use Topics    Alcohol use: Yes     Alcohol/week: 1.0 standard drink of alcohol     Types: 1 Glasses of wine per week     Comment: per month; (Never drank alcohol - per [employer])    Drug use: Not Currently        Review of Systems   Constitutional:  Negative for chills and fever.   HENT:  Negative for ear pain and sore throat.    Eyes:  Negative for pain and visual disturbance.   Respiratory:  Positive for chest tightness. Negative for apnea, cough, choking, shortness of breath, wheezing and stridor.    Cardiovascular:  Positive for chest pain. Negative for palpitations and leg swelling.   Gastrointestinal:  Negative for abdominal pain and vomiting.   Genitourinary:  Negative for dysuria and hematuria.   Musculoskeletal:  Negative for arthralgias, back pain and joint swelling.   Skin:  Negative for color change and rash.   Neurological:  Negative for seizures and syncope.   All other systems reviewed and are negative.      Physical Exam  Physical Exam  Vitals and nursing note reviewed.   Constitutional:       General: She is not in acute distress.     Appearance: She is well-developed.   HENT:      Head: Normocephalic and atraumatic.   Eyes:      Conjunctiva/sclera: Conjunctivae normal.   Cardiovascular:      Rate and Rhythm: Normal rate and regular rhythm.      Pulses: Normal pulses.      Heart sounds: Normal heart sounds. No murmur heard.     No friction rub. No gallop.   Pulmonary:      Effort: Pulmonary effort is normal. No respiratory distress.      Breath sounds: Normal breath sounds. No stridor. No wheezing, rhonchi or rales.   Chest:      Chest wall: Tenderness present.   Abdominal:      Palpations: Abdomen is soft.      Tenderness: There is no abdominal tenderness.   Musculoskeletal:         General: Tenderness present. No swelling.      Cervical back: Neck supple.      Right lower leg: No edema.      Left lower leg: No edema.   Skin:     General: Skin is warm and dry.      Capillary Refill: Capillary refill takes less than 2 seconds.   Neurological:      Mental Status: She is alert.   Psychiatric:         Mood and Affect: Mood normal.         Vital Signs  ED Triage Vitals   Temperature Pulse Respirations  Blood Pressure SpO2   12/29/23 1217 12/29/23 1217 12/29/23 1218 12/29/23 1221 12/29/23 1218   98.3 °F (36.8 °C) (!) 54 18 (!) 173/74 98 %      Temp Source Heart Rate Source Patient Position - Orthostatic VS BP Location FiO2 (%)   12/29/23 1217 12/29/23 1217 12/29/23 1221 12/29/23 1221 --   Oral Monitor Sitting Left arm       Pain Score       --                  Vitals:    12/29/23 1217 12/29/23 1221 12/29/23 1650 12/29/23 1700   BP:  (!) 173/74 (!) 207/88 (!) 195/83   Pulse: (!) 54  55 (!) 53   Patient Position - Orthostatic VS:  Sitting Sitting          Visual Acuity      ED Medications  Medications   ketorolac (TORADOL) injection 15 mg (15 mg Intravenous Given 12/29/23 1702)   acetaminophen (TYLENOL) tablet 650 mg (650 mg Oral Given 12/29/23 1701)       Diagnostic Studies  Results Reviewed       Procedure Component Value Units Date/Time    HS Troponin 0hr (reflex protocol) [[id number]]  (Normal) Collected: 12/29/23 1220    Lab Status: Final result Specimen: Blood from Arm, Left Updated: 12/29/23 1254     hs TnI 0hr 4 ng/L     Comprehensive metabolic panel [[id number]]  (Abnormal) Collected: 12/29/23 1220    Lab Status: Final result Specimen: Blood from Arm, Left Updated: 12/29/23 1250     Sodium 134 mmol/L      Potassium 4.6 mmol/L      Chloride 100 mmol/L      CO2 28 mmol/L      ANION GAP 6 mmol/L      BUN 23 mg/dL      Creatinine 0.91 mg/dL      Glucose 88 mg/dL      Calcium 10.3 mg/dL      AST 16 U/L      ALT 16 U/L      Alkaline Phosphatase 57 U/L      Total Protein 7.3 g/dL      Albumin 4.3 g/dL      Total Bilirubin 0.36 mg/dL      eGFR 64 ml/min/1.73sq m     Narrative:      National Kidney Disease Foundation guidelines for Chronic Kidney Disease (CKD):     Stage 1 with normal or high GFR (GFR > 90 mL/min/1.73 square meters)    Stage 2 Mild CKD (GFR = 60-89 mL/min/1.73 square meters)    Stage 3A Moderate CKD (GFR = 45-59 mL/min/1.73 square meters)    Stage 3B Moderate CKD (GFR = 30-44 mL/min/1.73 square  meters)    Stage 4 Severe CKD (GFR = 15-29 mL/min/1.73 square meters)    Stage 5 End Stage CKD (GFR <15 mL/min/1.73 square meters)  Note: GFR calculation is accurate only with a steady state creatinine    CBC and differential [[id number]] Collected: 12/29/23 1220    Lab Status: Final result Specimen: Blood from Arm, Left Updated: 12/29/23 1233     WBC 7.91 Thousand/uL      RBC 4.48 Million/uL      Hemoglobin 13.7 g/dL      Hematocrit 40.9 %      MCV 91 fL      MCH 30.6 pg      MCHC 33.5 g/dL      RDW 12.8 %      MPV 9.3 fL      Platelets 271 Thousands/uL      nRBC 0 /100 WBCs      Neutrophils Relative 65 %      Immat GRANS % 0 %      Lymphocytes Relative 25 %      Monocytes Relative 8 %      Eosinophils Relative 1 %      Basophils Relative 1 %      Neutrophils Absolute 5.13 Thousands/µL      Immature Grans Absolute 0.03 Thousand/uL      Lymphocytes Absolute 2.00 Thousands/µL      Monocytes Absolute 0.65 Thousand/µL      Eosinophils Absolute 0.05 Thousand/µL      Basophils Absolute 0.05 Thousands/µL                    XR chest 1 view portable   ED Interpretation by Brennan Hi PA-C (12/29 1701)   No acute fractures. No PTX. No pleural effusions.      Final Result by Isauro Walls MD (12/30 1045)      No acute cardiopulmonary disease.                  Workstation performed: [id number]                    Procedures  Procedures         ED Course                               SBIRT 20yo+      Flowsheet Row Most Recent Value   Initial Alcohol Screen: US AUDIT-C     1. How often do you have a drink containing alcohol? 0 Filed at: 12/29/2023 1801   2. How many drinks containing alcohol do you have on a typical day you are drinking?  0 Filed at: 12/29/2023 1801   3a. Male UNDER 65: How often do you have five or more drinks on one occasion? 0 Filed at: 12/29/2023 1801   3b. FEMALE Any Age, or MALE 65+: How often do you have 4 or more drinks on one occassion? 0 Filed at: 12/29/2023 1801   Audit-C Score 0 Filed at:  "12/29/2023 1801   BETTYE: How many times in the past year have you...    Used an illegal drug or used a prescription medication for non-medical reasons? Never Filed at: 12/29/2023 1801                      Medical Decision Making  68 year old female presenting today after fall with rib pain/chest pain TTP.  First nurse lab workup reassuring with some mild hyponatremia. Patient tolerating PO.  Pain improved after medication.  CXR did not reveal any acute fractures or dislocations.  Supplied with incentive spirometer and instructions on how to use it, and why.  ------------------------------------------------------------  Strict return precautions discussed. Patient at time of discharge well-appearing in no acute distress, all questions answered. Patient agreeable to plan.  Patient's vitals, lab/imaging results, diagnosis, and treatment plan were discussed with the patient. All new/changed medications were discussed with patient, specifically, route of administration, how often and when to take, and where they can be picked up. Strict return precautions as well as close follow up with PCP was discussed with the patient and the patient was agreeable to my recommendations.  Patient verbally acknowledged understanding of the above communications. All labs reviewed and utilized in the medical decision making process (if labs were ordered). Portions of the record may have been created with voice recognition software.  Occasional wrong word or \"sound a like\" substitutions may have occurred due to the inherent limitations of voice recognition software.  Read the chart carefully and recognize, using context, where substitutions have occurred.      Amount and/or Complexity of Data Reviewed  Labs: ordered.  Radiology: ordered and independent interpretation performed.    Risk  OTC drugs.  Prescription drug management.             Disposition  Final diagnoses:   Rib contusion, right, initial encounter   Fall from standing, initial " encounter   High blood pressure     Time reflects when diagnosis was documented in both MDM as applicable and the Disposition within this note       Time User Action Codes Description Comment    12/29/2023  5:46 PM Brennan Hi [S20.211A] Rib contusion, right, initial encounter     12/29/2023  5:46 PM Brennan Hi [W19.XXXA] Fall from standing, initial encounter     12/29/2023  6:06 PM Brennan Hi [I10] High blood pressure           ED Disposition       ED Disposition   Discharge    Condition   Stable    Date/Time   Fri Dec 29, 2023 1745    Comment   Saba Eid discharge to home/self care.                   Follow-up Information       Follow up With Specialties Details Why Contact Info Additional Information    [facility] Emergency Medicine Go to  If symptoms worsen [facility] [id number] [phone] [facility], [facility] [place], [place], [address]    Tiffany Yang DO Family Medicine Schedule an appointment as soon as possible for a visit  As needed [address] [place] [id number] [phone]               Discharge Medication List as of 12/29/2023  6:07 PM        START taking these medications    Details   naproxen (NAPROSYN) 375 mg tablet Take 1 tablet (375 mg total) by mouth 2 (two) times a day with meals, Starting Fri 12/29/2023, Normal      oxyCODONE (Roxicodone) 5 immediate release tablet Take 1 tablet (5 mg total) by mouth every 6 (six) hours as needed for severe pain for up to 10 days Max Daily Amount: 20 mg, Starting Fri 12/29/2023, Until Mon 1/8/2024 at 2359, Normal           CONTINUE these medications which have NOT CHANGED    Details   Ascorbic Acid (VITAMIN C) 100 MG tablet Take 100 mg by mouth daily, Historical Med      aspirin (ECOTRIN LOW STRENGTH) 81 mg EC tablet Take 81 mg by mouth daily, Historical Med      atorvastatin  (LIPITOR) 10 mg tablet TAKE 1 TABLET BY MOUTH EVERY DAY, Starting Mon 7/24/2023, Normal      calcium-vitamin D 250-100 MG-UNIT per tablet Take 1 tablet by mouth 2 (two) times a day, Historical Med      co-enzyme Q-10 30 MG capsule Take 30 mg by mouth daily, Historical Med      Cranberry 1000 MG CAPS Take by mouth, Historical Med      escitalopram (LEXAPRO) 10 mg tablet TAKE 1 TABLET BY MOUTH EVERY DAY, Starting Tue 10/17/2023, Normal      famotidine (PEPCID) 20 mg tablet Take 20 mg by mouth 2 (two) times a day, Historical Med      fluticasone (FLONASE) 50 mcg/act nasal spray 1 spray into each nostril daily, Historical Med      glucosamine-chondroitin 500-400 MG tablet Take 1 tablet by mouth 3 (three) times a day, Historical Med      lisinopril (ZESTRIL) 20 mg tablet TAKE 1 TABLET BY MOUTH EVERY DAY, Starting Tue 10/17/2023, Normal      LORazepam (ATIVAN) 1 mg tablet Take 0.5 tablets (0.5 mg total) by mouth every 8 (eight) hours as needed for anxiety, Starting Fri 4/21/2023, Normal      metoprolol succinate (TOPROL-XL) 50 mg 24 hr tablet TAKE 1 TABLET BY MOUTH EVERY DAY, Starting Tue 10/17/2023, Normal      Multiple Vitamins-Minerals (MULTIVITAMIN WITH MINERALS) tablet Take 1 tablet by mouth daily, Historical Med      Omega-3 Fatty Acids (FISH OIL) 1,000 mg Take 1,000 mg by mouth daily, Historical Med      vitamin E 100 UNIT capsule Take 100 Units by mouth daily, Historical Med             No discharge procedures on file.    PDMP Review         Value Time User    PDMP Reviewed  Yes 4/21/2023  9:15 AM Tiffany Yang DO            ED Provider  Electronically Signed by             Brennan Hi PA-C  01/03/24 5626

## 2024-01-05 ENCOUNTER — TELEPHONE (OUTPATIENT)
Age: 69
End: 2024-01-05

## 2024-01-05 DIAGNOSIS — R07.81 RIB PAIN: Primary | ICD-10-CM

## 2024-01-05 DIAGNOSIS — S20.211A RIB CONTUSION, RIGHT, INITIAL ENCOUNTER: ICD-10-CM

## 2024-01-05 RX ORDER — OXYCODONE HYDROCHLORIDE 5 MG/1
5 TABLET ORAL EVERY 6 HOURS PRN
Qty: 10 TABLET | Refills: 0 | OUTPATIENT
Start: 2024-01-05 | End: 2024-01-15

## 2024-01-05 RX ORDER — MELOXICAM 15 MG/1
15 TABLET ORAL DAILY PRN
Qty: 30 TABLET | Refills: 0 | Status: SHIPPED | OUTPATIENT
Start: 2024-01-05 | End: 2024-02-02

## 2024-01-05 RX ORDER — LIDOCAINE 50 MG/G
1 PATCH TOPICAL DAILY
Qty: 5 PATCH | Refills: 0 | Status: SHIPPED | OUTPATIENT
Start: 2024-01-05

## 2024-01-05 NOTE — TELEPHONE ENCOUNTER
PA for Lidocaine (Lidoderm) 5 %    Submitted via  []CMM-KEY   [x][employer]-Case ID # [id number]  []Faxed to plan   []Other website   []Phone call Case ID #     Office notes sent, clinical questions answered. Awaiting determination

## 2024-01-05 NOTE — TELEPHONE ENCOUNTER
Reason for call:   [x] Refill   [] Prior Auth  [] Other:     Office:   [x] PCP/Provider - Tiffany Pitts  [] Specialty/Provider -     Medication: Oxycodone IR    Dose/Frequency: 5 mg  Q 6 HRS PRN    Quantity:  10    Ph[facility] [place] [facility] [place]    Does the patient have enough for 3 days?   [] Yes   [x] No - Send as HP to POD

## 2024-01-05 NOTE — TELEPHONE ENCOUNTER
She should be evaluated sooner as she was recently seen in the ER and advised about close follow-up.  If she is still having that extent of pain where she is needing narcotics she should certainly be evaluated sooner.

## 2024-01-05 NOTE — TELEPHONE ENCOUNTER
PA for Lidocaine (Lidoderm) 5 % Denied    Reason:        Message sent to office clinical pool Yes    Denial letter scanned into Media Yes    Appeal started No

## 2024-01-05 NOTE — TELEPHONE ENCOUNTER
Pt is sched for Tues 1/9 at 11:00 she would of done Monday but was worried about the snow.  Pt said the refill would really help, she is still having pain and also trying to care for her rigoberto who she said had kidney surgery to remove cancer.

## 2024-01-05 NOTE — TELEPHONE ENCOUNTER
Patient is upset when office calls her the number comes up private. She can not call back and never talks to anyone in office. Please call her on her husbands phone [phone]

## 2024-01-05 NOTE — TELEPHONE ENCOUNTER
Reviewed, no evidence of rib fracture.  Higher strength NSAID sent to pharmacy can take daily as needed as well as lidoderm patch.  Keep appointment upcoming for reevaluation.

## 2024-01-07 ENCOUNTER — RA CDI HCC (OUTPATIENT)
Dept: OTHER | Facility: HOSPITAL | Age: 69
End: 2024-01-07

## 2024-01-09 ENCOUNTER — OFFICE VISIT (OUTPATIENT)
Dept: FAMILY MEDICINE CLINIC | Facility: MEDICAL CENTER | Age: 69
End: 2024-01-09
Payer: COMMERCIAL

## 2024-01-09 VITALS
HEART RATE: 58 BPM | RESPIRATION RATE: 18 BRPM | BODY MASS INDEX: 29.76 KG/M2 | TEMPERATURE: 99.2 F | SYSTOLIC BLOOD PRESSURE: 134 MMHG | OXYGEN SATURATION: 97 % | HEIGHT: 60 IN | DIASTOLIC BLOOD PRESSURE: 86 MMHG | WEIGHT: 151.6 LBS

## 2024-01-09 DIAGNOSIS — Z09 HOSPITAL DISCHARGE FOLLOW-UP: Primary | ICD-10-CM

## 2024-01-09 DIAGNOSIS — F41.9 ANXIETY: ICD-10-CM

## 2024-01-09 PROCEDURE — 99213 OFFICE O/P EST LOW 20 MIN: CPT | Performed by: STUDENT IN AN ORGANIZED HEALTH CARE EDUCATION/TRAINING PROGRAM

## 2024-01-09 RX ORDER — LORAZEPAM 1 MG/1
0.5 TABLET ORAL EVERY 8 HOURS PRN
Qty: 30 TABLET | Refills: 0 | Status: SHIPPED | OUTPATIENT
Start: 2024-01-09

## 2024-01-09 NOTE — PROGRESS NOTES
"  [facility] - Clinic Note  Tiffany Yang DO, 24     Saba Eid MRN: [id number] : 1955 Age: 68 y.o.     Assessment/Plan     1. Hospital discharge follow-up    -Patient presents for ER follow-up after fall, sustaining rib contusion  -Pain well-managed with naproxen  -Imaging revealed no fractures  - chest x-ray:  FINDINGS:  Cardiomediastinal silhouette appears unremarkable. Epicardial fat is noted. Retrocardiac density is consistent with hiatus hernia noted on CT.  The lungs are clear. Groundglass nodule noted on recent CT is not visible on x-ray. No pneumothorax or pleural effusion.  Osseous structures appear within normal limits for patient age. No obvious fractures utilizing chest technique. Periarticular calcification adjacent to the left humerus  IMPRESSION:  No acute cardiopulmonary disease.  -Encouraged continued use of incentive spirometer  -Follow-up repeat BMP to recheck sodium level and calcium level which were slightly elevated with labs in ER    2. Anxiety    - LORazepam (ATIVAN) 1 mg tablet; Take 0.5 tablets (0.5 mg total) by mouth every 8 (eight) hours as needed for anxiety  Dispense: 30 tablet; Refill: 0      Saba Eid acknowledged understanding of treatment plan, all questions answered.  Follow-up in 6 months and sooner as needed.    Subjective      Saba Eid is a 68 y.o. female who presents for ER follow-up.  She was evaluated at [facility] ER on 2023 after a fall.  She did not hit her head.  She fell onto her right side.  Patient describes that there is a step from her kitchen to her dining room and she may have got her sneaker caught.  Patient received Toradol injection in ER along with dose of Tylenol.  She has been using incentive spirometer.  Patient states she \"feels much better today\", rates pain 5/10 today.  She does still have some pain right sided rib region and also reports right breast pain. She has been " taking naproxen 325 mg p.o. twice daily with meals. She has not noticed any breast bruising.  Patient requests refill of Ativan.    The following portions of the patient's history were reviewed and updated as appropriate: allergies, current medications, past family history, past medical history, past social history, past surgical history and problem list.       Past Medical History:   Diagnosis Date    GERD (gastroesophageal reflux disease)     Hyperlipidemia     Hypertension     UTI (urinary tract infection)        No Known Allergies    Past Surgical History:   Procedure Laterality Date    ME COLONOSCOPY FLX DX W/COLLJ SPEC WHEN PFRMD N/A 2018    Procedure: EGD AND COLONOSCOPY;  Surgeon: Valerie Swenson MD;  Location: [facility];  Service: Gastroenterology       Family History   Problem Relation Age of Onset    No Known Problems Sister     Heart disease Mother     Diabetes Father         Diabetes mellitus    Heart disease Father     Colon cancer Maternal Aunt     Colon cancer Maternal Aunt     No Known Problems Daughter     No Known Problems Maternal Grandmother     No Known Problems Maternal Grandfather     No Known Problems Paternal Grandmother     No Known Problems Paternal Grandfather     No Known Problems Brother     No Known Problems Half-Sister        Social History     Socioeconomic History    Marital status: /Civil Union     Spouse name: None    Number of children: None    Years of education: None    Highest education level: None   Occupational History    None   Tobacco Use    Smoking status: Every Day     Average packs/day: 1.4 packs/day for 46.9 years (67.5 ttl pk-yrs)     Types: E-Cigarettes, Cigarettes     Start date: 10/3/1973     Last attempt to quit: 10/3/2018     Years since quittin.2    Smokeless tobacco: Former     Quit date: 11/3/2018   Substance and Sexual Activity    Alcohol use: Yes     Alcohol/week: 1.0 standard drink of alcohol     Types: 1 Glasses of wine per week      Comment: per month; (Never drank alcohol - per [employer])    Drug use: Not Currently    Sexual activity: None   Other Topics Concern    None   Social History Narrative    Caffeine use     Social Determinants of Health     Financial Resource Strain: Low Risk  (4/20/2023)    Overall Financial Resource Strain (CARDIA)     Difficulty of Paying Living Expenses: Not hard at all   Food Insecurity: Not on file   Transportation Needs: No Transportation Needs (4/20/2023)    PRAPARE - Transportation     Lack of Transportation (Medical): No     Lack of Transportation (Non-Medical): No   Physical Activity: Not on file   Stress: Not on file   Social Connections: Not on file   Intimate Partner Violence: Not on file   Housing Stability: Not on file       Current Outpatient Medications   Medication Sig Dispense Refill    Ascorbic Acid (VITAMIN C) 100 MG tablet Take 100 mg by mouth daily      aspirin (ECOTRIN LOW STRENGTH) 81 mg EC tablet Take 81 mg by mouth daily      atorvastatin (LIPITOR) 10 mg tablet TAKE 1 TABLET BY MOUTH EVERY DAY 90 tablet 1    calcium-vitamin D 250-100 MG-UNIT per tablet Take 1 tablet by mouth 2 (two) times a day      co-enzyme Q-10 30 MG capsule Take 30 mg by mouth daily      Cranberry 1000 MG CAPS Take by mouth      escitalopram (LEXAPRO) 10 mg tablet TAKE 1 TABLET BY MOUTH EVERY DAY 90 tablet 1    famotidine (PEPCID) 20 mg tablet Take 20 mg by mouth 2 (two) times a day      fluticasone (FLONASE) 50 mcg/act nasal spray 1 spray into each nostril daily      glucosamine-chondroitin 500-400 MG tablet Take 1 tablet by mouth 3 (three) times a day      lidocaine (Lidoderm) 5 % Apply 1 patch topically over 12 hours daily Remove & Discard patch within 12 hours or as directed by MD 5 patch 0    lisinopril (ZESTRIL) 20 mg tablet TAKE 1 TABLET BY MOUTH EVERY DAY 90 tablet 1    LORazepam (ATIVAN) 1 mg tablet Take 0.5 tablets (0.5 mg total) by mouth every 8 (eight) hours as needed for anxiety 30 tablet 0    metoprolol  succinate (TOPROL-XL) 50 mg 24 hr tablet TAKE 1 TABLET BY MOUTH EVERY DAY 90 tablet 1    Multiple Vitamins-Minerals (MULTIVITAMIN WITH MINERALS) tablet Take 1 tablet by mouth daily      naproxen (NAPROSYN) 375 mg tablet Take 1 tablet (375 mg total) by mouth 2 (two) times a day with meals 20 tablet 0    Omega-3 Fatty Acids (FISH OIL) 1,000 mg Take 1,000 mg by mouth daily      vitamin E 100 UNIT capsule Take 100 Units by mouth daily      meloxicam (Mobic) 15 mg tablet Take 1 tablet (15 mg total) by mouth daily as needed for moderate pain (Patient not taking: Reported on 1/9/2024) 30 tablet 0     No current facility-administered medications for this visit.       Review of Systems     As noted in HPI    Objective      /86 (BP Location: Left arm, Patient Position: Sitting, Cuff Size: Large)   Pulse 58   Temp 99.2 °F (37.3 °C)   Resp 18   Ht 5' (1.524 m)   Wt 68.8 kg (151 lb 9.6 oz)   SpO2 97%   BMI 29.61 kg/m²     Physical Exam  Vitals reviewed. Exam conducted with a chaperone present (SUMMER Haley).   Constitutional:       General: She is not in acute distress.     Appearance: Normal appearance.   HENT:      Head: Normocephalic and atraumatic.   Eyes:      Conjunctiva/sclera: Conjunctivae normal.   Cardiovascular:      Rate and Rhythm: Normal rate and regular rhythm.      Pulses: Normal pulses.      Heart sounds: Normal heart sounds.   Pulmonary:      Effort: Pulmonary effort is normal.      Breath sounds: Normal breath sounds.   Chest:   Breasts:     Right: Tenderness present. No swelling, bleeding, inverted nipple, mass, nipple discharge or skin change.      Comments: No breast hematoma  Musculoskeletal:         General: No swelling.        Arms:       Comments: Right upper rib region pain    Skin:     Findings: No bruising.   Neurological:      Mental Status: She is alert and oriented to person, place, and time.   Psychiatric:         Mood and Affect: Mood normal.         Behavior: Behavior normal.          "Thought Content: Thought content normal.             Some portions of this record may have been generated with voice recognition software. There may be translation, syntax, or grammatical errors. Occasional wrong word or \"sound-a-like\" substitutions may have occurred due to the inherent limitations of the voice recognition software. Read the chart carefully and recognize, using context, where substations may have occurred. If you have any questions, please contact the dictating provider for clarification or correction, as needed.  "

## 2024-01-15 DIAGNOSIS — E78.5 DYSLIPIDEMIA: ICD-10-CM

## 2024-01-15 RX ORDER — ATORVASTATIN CALCIUM 10 MG/1
10 TABLET, FILM COATED ORAL DAILY
Qty: 90 TABLET | Refills: 0 | Status: SHIPPED | OUTPATIENT
Start: 2024-01-15

## 2024-01-29 ENCOUNTER — APPOINTMENT (OUTPATIENT)
Dept: LAB | Facility: MEDICAL CENTER | Age: 69
End: 2024-01-29
Payer: COMMERCIAL

## 2024-01-29 DIAGNOSIS — I10 PRIMARY HYPERTENSION: ICD-10-CM

## 2024-01-29 DIAGNOSIS — E78.00 PURE HYPERCHOLESTEROLEMIA: ICD-10-CM

## 2024-01-29 LAB
ANION GAP SERPL CALCULATED.3IONS-SCNC: 5 MMOL/L
BUN SERPL-MCNC: 19 MG/DL (ref 5–25)
CALCIUM SERPL-MCNC: 9.9 MG/DL (ref 8.4–10.2)
CHLORIDE SERPL-SCNC: 100 MMOL/L (ref 96–108)
CHOLEST SERPL-MCNC: 213 MG/DL
CO2 SERPL-SCNC: 30 MMOL/L (ref 21–32)
CREAT SERPL-MCNC: 0.81 MG/DL (ref 0.6–1.3)
GFR SERPL CREATININE-BSD FRML MDRD: 73 ML/MIN/1.73SQ M
GLUCOSE P FAST SERPL-MCNC: 97 MG/DL (ref 65–99)
HDLC SERPL-MCNC: 73 MG/DL
LDLC SERPL CALC-MCNC: 97 MG/DL (ref 0–100)
POTASSIUM SERPL-SCNC: 4.7 MMOL/L (ref 3.5–5.3)
SODIUM SERPL-SCNC: 135 MMOL/L (ref 135–147)
TRIGL SERPL-MCNC: 216 MG/DL

## 2024-01-29 PROCEDURE — 80061 LIPID PANEL: CPT

## 2024-01-29 PROCEDURE — 36415 COLL VENOUS BLD VENIPUNCTURE: CPT

## 2024-01-29 PROCEDURE — 80048 BASIC METABOLIC PNL TOTAL CA: CPT

## 2024-02-02 DIAGNOSIS — R07.81 RIB PAIN: ICD-10-CM

## 2024-02-02 RX ORDER — MELOXICAM 15 MG/1
15 TABLET ORAL DAILY PRN
Qty: 30 TABLET | Refills: 5 | Status: SHIPPED | OUTPATIENT
Start: 2024-02-02

## 2024-04-12 DIAGNOSIS — I10 ESSENTIAL HYPERTENSION: ICD-10-CM

## 2024-04-12 DIAGNOSIS — I10 PRIMARY HYPERTENSION: ICD-10-CM

## 2024-04-12 DIAGNOSIS — F41.9 ANXIETY: ICD-10-CM

## 2024-04-12 DIAGNOSIS — E78.5 DYSLIPIDEMIA: ICD-10-CM

## 2024-04-12 RX ORDER — METOPROLOL SUCCINATE 50 MG/1
50 TABLET, EXTENDED RELEASE ORAL DAILY
Qty: 90 TABLET | Refills: 1 | Status: SHIPPED | OUTPATIENT
Start: 2024-04-12

## 2024-04-12 RX ORDER — ATORVASTATIN CALCIUM 10 MG/1
10 TABLET, FILM COATED ORAL DAILY
Qty: 90 TABLET | Refills: 1 | Status: SHIPPED | OUTPATIENT
Start: 2024-04-12

## 2024-04-12 RX ORDER — ESCITALOPRAM OXALATE 10 MG/1
10 TABLET ORAL DAILY
Qty: 90 TABLET | Refills: 1 | Status: SHIPPED | OUTPATIENT
Start: 2024-04-12

## 2024-04-12 RX ORDER — LISINOPRIL 20 MG/1
20 TABLET ORAL DAILY
Qty: 90 TABLET | Refills: 1 | Status: SHIPPED | OUTPATIENT
Start: 2024-04-12

## 2024-07-09 ENCOUNTER — OFFICE VISIT (OUTPATIENT)
Dept: FAMILY MEDICINE CLINIC | Facility: MEDICAL CENTER | Age: 69
End: 2024-07-09
Payer: COMMERCIAL

## 2024-07-09 VITALS
HEART RATE: 60 BPM | RESPIRATION RATE: 16 BRPM | BODY MASS INDEX: 32.15 KG/M2 | DIASTOLIC BLOOD PRESSURE: 62 MMHG | TEMPERATURE: 97.9 F | SYSTOLIC BLOOD PRESSURE: 130 MMHG | OXYGEN SATURATION: 98 % | WEIGHT: 164.6 LBS

## 2024-07-09 DIAGNOSIS — Z13.29 SCREENING FOR THYROID DISORDER: ICD-10-CM

## 2024-07-09 DIAGNOSIS — E78.00 PURE HYPERCHOLESTEROLEMIA: ICD-10-CM

## 2024-07-09 DIAGNOSIS — Z00.00 MEDICARE ANNUAL WELLNESS VISIT, SUBSEQUENT: Primary | ICD-10-CM

## 2024-07-09 DIAGNOSIS — Z12.31 ENCOUNTER FOR SCREENING MAMMOGRAM FOR MALIGNANT NEOPLASM OF BREAST: ICD-10-CM

## 2024-07-09 DIAGNOSIS — F41.9 ANXIETY: ICD-10-CM

## 2024-07-09 DIAGNOSIS — I10 PRIMARY HYPERTENSION: ICD-10-CM

## 2024-07-09 PROCEDURE — G0439 PPPS, SUBSEQ VISIT: HCPCS | Performed by: STUDENT IN AN ORGANIZED HEALTH CARE EDUCATION/TRAINING PROGRAM

## 2024-07-09 RX ORDER — LORAZEPAM 1 MG/1
0.5 TABLET ORAL EVERY 8 HOURS PRN
Qty: 30 TABLET | Refills: 0 | Status: SHIPPED | OUTPATIENT
Start: 2024-07-09

## 2024-07-09 NOTE — PROGRESS NOTES
Ambulatory Visit  Name: Saba Eid      : 1955      MRN: [id number]  Encounter Provider: Tiffany Yang DO  Encounter Date: 2024   Encounter department: [facility]    Assessment & Plan   1. Medicare annual wellness visit, subsequent  Assessment & Plan:  Declines further COVID-19 vaccines  Will plan for PCV 20 vaccine 2026  Informed about new RSV vaccine, she will consider  Immunizations otherwise up-to-date  2. Encounter for screening mammogram for malignant neoplasm of breast  -     Mammo screening bilateral w 3d & cad; Future; Expected date: 2024  3. Pure hypercholesterolemia  -     Lipid Panel with Direct LDL reflex; Future; Expected date: 2025  4. Primary hypertension  -     Basic metabolic panel; Future; Expected date: 2024  -     Basic metabolic panel; Future; Expected date: 2025  5. Screening for thyroid disorder  -     TSH, 3rd generation with Free T4 reflex; Future; Expected date: 2024  6. Anxiety  -     LORazepam (ATIVAN) 1 mg tablet; Take 0.5 tablets (0.5 mg total) by mouth every 8 (eight) hours as needed for anxiety      Depression Screening and Follow-up Plan: Patient was screened for depression during today's encounter. They screened negative with a PHQ-9 score of 4.      Follow-up in 6 months and sooner as needed.    Preventive health issues were discussed with patient, and age appropriate screening tests were ordered as noted in patient's After Visit Summary. Personalized health advice and appropriate referrals for health education or preventive services given if needed, as noted in patient's After Visit Summary.    History of Present Illness     HPI     Patient request refill of lorazepam.    Patient Care Team:  Tiffany Yang DO as PCP - General (Family Medicine)  Tiffany Yang DO as PCP - PCP-[facility] ([facility]  MD Velia Shearer MD Michael G Jusinski, MD Chatargy Kaza, MD as  Endoscopist    Review of Systems    As noted in HPI   Medical History Reviewed by provider this encounter:  Tobacco  Allergies  Meds  Problems  Med Hx  Surg Hx  Fam Hx       Annual Wellness Visit Questionnaire   Saba is here for her Subsequent Wellness visit.     Health Risk Assessment:   Patient rates overall health as good. Patient feels that their physical health rating is same. Patient is dissatisfied with their life. Eyesight was rated as same. Hearing was rated as same. Patient feels that their emotional and mental health rating is slightly worse. Patients states they are never, rarely angry. Patient states they are sometimes unusually tired/fatigued. Pain experienced in the last 7 days has been none. Patient states that she has experienced no weight loss or gain in last 6 months.  unfortunately currently hospitalized    Depression Screening:   PHQ-9 Score: 4      Fall Risk Screening:   In the past year, patient has experienced: history of falling in past year    Number of falls: 1  Injured during fall?: Yes    Feels unsteady when standing or walking?: No    Worried about falling?: No      Urinary Incontinence Screening:   Patient has not leaked urine accidently in the last six months.     Home Safety:  Patient does not have trouble with stairs inside or outside of their home. Patient has working smoke alarms and has working carbon monoxide detector. Home safety hazards include: none.     Nutrition:   Current diet is Regular.     Medications:   Patient is currently taking over-the-counter supplements. OTC medications include: see medication list. Patient is able to manage medications.     Activities of Daily Living (ADLs)/Instrumental Activities of Daily Living (IADLs):   Walk and transfer into and out of bed and chair?: Yes  Dress and groom yourself?: Yes    Bathe or shower yourself?: Yes    Feed yourself? Yes  Do your laundry/housekeeping?: Yes  Manage your money, pay your bills and track your  expenses?: Yes  Make your own meals?: Yes    Do your own shopping?: Yes    Previous Hospitalizations:   Any hospitalizations or ED visits within the last 12 months?: No      Advance Care Planning:   Living will: Yes    Durable POA for healthcare: Yes    Advanced directive: Yes      Cognitive Screening:   Provider or family/friend/caregiver concerned regarding cognition?: No    PREVENTIVE SCREENINGS      Cardiovascular Screening:    General: History Lipid Disorder      Diabetes Screening:     General: Risks and Benefits Discussed    Due for: Blood Glucose      Colorectal Cancer Screening:     General: Screening Current      Breast Cancer Screening:     General: Screening Current      Cervical Cancer Screening:    General: Screening Not Indicated      Osteoporosis Screening:    General: Screening Current      Abdominal Aortic Aneurysm (AAA) Screening:        General: Screening Not Indicated      Lung Cancer Screening:     General: Screening Current      Hepatitis C Screening:    General: Screening Current    Screening, Brief Intervention, and Referral to Treatment (SBIRT)    Screening  Typical number of drinks in a day: 0  Typical number of drinks in a week: 0  Interpretation: Low risk drinking behavior.    Single Item Drug Screening:  How often have you used an illegal drug (including marijuana) or a prescription medication for non-medical reasons in the past year? never    Single Item Drug Screen Score: 0  Interpretation: Negative screen for possible drug use disorder    Other Counseling Topics:   Car/seat belt/driving safety, sunscreen and calcium and vitamin D intake and regular weightbearing exercise.     Social Determinants of Health     Financial Resource Strain: Low Risk  (4/20/2023)    Overall Financial Resource Strain (CARDIA)     Difficulty of Paying Living Expenses: Not hard at all   Food Insecurity: No Food Insecurity (7/9/2024)    Hunger Vital Sign     Worried About Running Out of Food in the Last Year:  Never true     Ran Out of Food in the Last Year: Never true   Transportation Needs: No Transportation Needs (7/9/2024)    PRAPARE - Transportation     Lack of Transportation (Medical): No     Lack of Transportation (Non-Medical): No   Housing Stability: Low Risk  (7/9/2024)    Housing Stability Vital Sign     Unable to Pay for Housing in the Last Year: No     Number of Times Moved in the Last Year: 1     Homeless in the Last Year: No   Utilities: Not At Risk (7/9/2024)    [facility] Utilities     Threatened with loss of utilities: No     No results found.    Objective     /62 (BP Location: Left arm, Patient Position: Sitting, Cuff Size: Standard)   Pulse 60   Temp 97.9 °F (36.6 °C) (Temporal)   Resp 16   Wt 74.7 kg (164 lb 9.6 oz)   SpO2 98%   BMI 32.15 kg/m²     Physical Exam  Vitals reviewed.   Constitutional:       General: She is not in acute distress.     Appearance: Normal appearance.   HENT:      Head: Normocephalic and atraumatic.   Eyes:      Conjunctiva/sclera: Conjunctivae normal.   Pulmonary:      Effort: Pulmonary effort is normal.   Neurological:      Mental Status: She is alert and oriented to person, place, and time.   Psychiatric:         Mood and Affect: Mood normal.         Behavior: Behavior normal.         Thought Content: Thought content normal.         Judgment: Judgment normal.

## 2024-07-09 NOTE — PATIENT INSTRUCTIONS
Medicare Preventive Visit Patient Instructions  Thank you for completing your Welcome to Medicare Visit or Medicare Annual Wellness Visit today. Your next wellness visit will be due in one year (7/10/2025).  The screening/preventive services that you may require over the next 5-10 years are detailed below. Some tests may not apply to you based off risk factors and/or age. Screening tests ordered at today's visit but not completed yet may show as past due. Also, please note that scanned in results may not display below.  Preventive Screenings:  Service Recommendations Previous Testing/Comments   Colorectal Cancer Screening  * Colonoscopy    * Fecal Occult Blood Test (FOBT)/Fecal Immunochemical Test (FIT)  * Fecal DNA/Cologuard Test  * Flexible Sigmoidoscopy Age: 45-75 years old   Colonoscopy: every 10 years (may be performed more frequently if at higher risk)  OR  FOBT/FIT: every 1 year  OR  Cologuard: every 3 years  OR  Sigmoidoscopy: every 5 years  Screening may be recommended earlier than age 45 if at higher risk for colorectal cancer. Also, an individualized decision between you and your healthcare provider will decide whether screening between the ages of 76-85 would be appropriate. Colonoscopy: 01/24/2018  FOBT/FIT: Not on file  Cologuard: Not on file  Sigmoidoscopy: Not on file    Screening Current     Breast Cancer Screening Age: 40+ years old  Frequency: every 1-2 years  Not required if history of left and right mastectomy Mammogram: 12/04/2023    Screening Current   Cervical Cancer Screening Between the ages of 21-29, pap smear recommended once every 3 years.   Between the ages of 30-65, can perform pap smear with HPV co-testing every 5 years.   Recommendations may differ for women with a history of total hysterectomy, cervical cancer, or abnormal pap smears in past. Pap Smear: 10/10/2018    Screening Not Indicated   Hepatitis C Screening Once for adults born between 1945 and 1965  More frequently in  patients at high risk for Hepatitis C Hep C Antibody: 11/30/2022    Screening Current   Diabetes Screening 1-2 times per year if you're at risk for diabetes or have pre-diabetes Fasting glucose: 97 mg/dL (1/29/2024)  A1C: 5.6 (4/21/2023)  Screening Current   Cholesterol Screening Once every 5 years if you don't have a lipid disorder. May order more often based on risk factors. Lipid panel: 01/29/2024    Screening Not Indicated  History Lipid Disorder     Other Preventive Screenings Covered by Medicare:  Abdominal Aortic Aneurysm (AAA) Screening: covered once if your at risk. You're considered to be at risk if you have a family history of AAA.  Lung Cancer Screening: covers low dose CT scan once per year if you meet all of the following conditions: (1) Age 55-77; (2) No signs or symptoms of lung cancer; (3) Current smoker or have quit smoking within the last 15 years; (4) You have a tobacco smoking history of at least 20 pack years (packs per day multiplied by number of years you smoked); (5) You get a written order from a healthcare provider.  Glaucoma Screening: covered annually if you're considered high risk: (1) You have diabetes OR (2) Family history of glaucoma OR (3)  aged 50 and older OR (4)  American aged 65 and older  Osteoporosis Screening: covered every 2 years if you meet one of the following conditions: (1) You're estrogen deficient and at risk for osteoporosis based off medical history and other findings; (2) Have a vertebral abnormality; (3) On glucocorticoid therapy for more than 3 months; (4) Have primary hyperparathyroidism; (5) On osteoporosis medications and need to assess response to drug therapy.   Last bone density test (DXA Scan): 12/04/2023.  HIV Screening: covered annually if you're between the age of 15-65. Also covered annually if you are younger than 15 and older than 65 with risk factors for HIV infection. For pregnant patients, it is covered up to 3 times per  pregnancy.    Immunizations:  Immunization Recommendations   Influenza Vaccine Annual influenza vaccination during flu season is recommended for all persons aged >= 6 months who do not have contraindications   Pneumococcal Vaccine   * Pneumococcal conjugate vaccine = PCV13 (Prevnar 13), PCV15 (Vaxneuvance), PCV20 (Prevnar 20)  * Pneumococcal polysaccharide vaccine = PPSV23 (Pneumovax) Adults 19-65 yo with certain risk factors or if 65+ yo  If never received any pneumonia vaccine: recommend Prevnar 20 (PCV20)  Give PCV20 if previously received 1 dose of PCV13 or PPSV23   Hepatitis B Vaccine 3 dose series if at intermediate or high risk (ex: diabetes, end stage renal disease, liver disease)   Respiratory syncytial virus (RSV) Vaccine - COVERED BY MEDICARE PART D  * RSVPreF3 (Arexvy) CDC recommends that adults 60 years of age and older may receive a single dose of RSV vaccine using shared clinical decision-making (SCDM)   Tetanus (Td) Vaccine - COST NOT COVERED BY MEDICARE PART B Following completion of primary series, a booster dose should be given every 10 years to maintain immunity against tetanus. Td may also be given as tetanus wound prophylaxis.   Tdap Vaccine - COST NOT COVERED BY MEDICARE PART B Recommended at least once for all adults. For pregnant patients, recommended with each pregnancy.   Shingles Vaccine (Shingrix) - COST NOT COVERED BY MEDICARE PART B  2 shot series recommended in those 19 years and older who have or will have weakened immune systems or those 50 years and older     Health Maintenance Due:      Topic Date Due   • Lung Cancer Screening  12/04/2024   • Breast Cancer Screening: Mammogram  12/04/2025   • Colorectal Cancer Screening  01/24/2028   • DXA SCAN  12/04/2028   • Hepatitis C Screening  Completed     Immunizations Due:      Topic Date Due   • COVID-19 Vaccine (5 - 2023-24 season) 09/01/2023   • Influenza Vaccine (1) 09/01/2024     Advance Directives   What are advance directives?   Advance directives are legal documents that state your wishes and plans for medical care. These plans are made ahead of time in case you lose your ability to make decisions for yourself. Advance directives can apply to any medical decision, such as the treatments you want, and if you want to donate organs.   What are the types of advance directives?  There are many types of advance directives, and each state has rules about how to use them. You may choose a combination of any of the following:  Living will:  This is a written record of the treatment you want. You can also choose which treatments you do not want, which to limit, and which to stop at a certain time. This includes surgery, medicine, IV fluid, and tube feedings.   Durable power of  for healthcare (DPAHC):  This is a written record that states who you want to make healthcare choices for you when you are unable to make them for yourself. This person, called a proxy, is usually a family member or a friend. You may choose more than 1 proxy.  Do not resuscitate (DNR) order:  A DNR order is used in case your heart stops beating or you stop breathing. It is a request not to have certain forms of treatment, such as CPR. A DNR order may be included in other types of advance directives.  Medical directive:  This covers the care that you want if you are in a coma, near death, or unable to make decisions for yourself. You can list the treatments you want for each condition. Treatment may include pain medicine, surgery, blood transfusions, dialysis, IV or tube feedings, and a ventilator (breathing machine).  Values history:  This document has questions about your views, beliefs, and how you feel and think about life. This information can help others choose the care that you would choose.  Why are advance directives important?  An advance directive helps you control your care. Although spoken wishes may be used, it is better to have your wishes written down.  Spoken wishes can be misunderstood, or not followed. Treatments may be given even if you do not want them. An advance directive may make it easier for your family to make difficult choices about your care.   Fall Prevention    Fall prevention  includes ways to make your home and other areas safer. It also includes ways you can move more carefully to prevent a fall. Health conditions that cause changes in your blood pressure, vision, or muscle strength and coordination may increase your risk for falls. Medicines may also increase your risk for falls if they make you dizzy, weak, or sleepy.   Fall prevention tips:   Stand or sit up slowly.    Use assistive devices as directed.    Wear shoes that fit well and have soles that .    Wear a personal alarm.    Stay active.    Manage your medical conditions.    Home Safety Tips:  Add items to prevent falls in the bathroom.    Keep paths clear.    Install bright lights in your home.    Keep items you use often on shelves within reach.    Paint or place reflective tape on the edges of your stairs.    Cigarette Smoking and Your Health   Risks to your health if you smoke:  Nicotine and other chemicals found in tobacco damage every cell in your body. Even if you are a light smoker, you have an increased risk for cancer, heart disease, and lung disease. If you are pregnant or have diabetes, smoking increases your risk for complications.   Benefits to your health if you stop smoking:   You decrease respiratory symptoms such as coughing, wheezing, and shortness of breath.   You reduce your risk for cancers of the lung, mouth, throat, kidney, bladder, pancreas, stomach, and cervix. If you already have cancer, you increase the benefits of chemotherapy. You also reduce your risk for cancer returning or a second cancer from developing.   You reduce your risk for heart disease, blood clots, heart attack, and stroke.   You reduce your risk for lung infections, and diseases such as  pneumonia, asthma, chronic bronchitis, and emphysema.  Your circulation improves. More oxygen can be delivered to your body. If you have diabetes, you lower your risk for complications, such as kidney, artery, and eye diseases. You also lower your risk for nerve damage. Nerve damage can lead to amputations, poor vision, and blindness.  You improve your body's ability to heal and to fight infections.  For more information and support to stop smoking:   [employer]  Phone: 9- 912 - [phone]  Web Address: www.[address]  Weight Management   Why it is important to manage your weight:  Being overweight increases your risk of health conditions such as heart disease, high blood pressure, type 2 diabetes, and certain types of cancer. It can also increase your risk for osteoarthritis, sleep apnea, and other respiratory problems. Aim for a slow, steady weight loss. Even a small amount of weight loss can lower your risk of health problems.  How to lose weight safely:  A safe and healthy way to lose weight is to eat fewer calories and get regular exercise. You can lose up about 1 pound a week by decreasing the number of calories you eat by 500 calories each day.   Healthy meal plan for weight management:  A healthy meal plan includes a variety of foods, contains fewer calories, and helps you stay healthy. A healthy meal plan includes the following:  Eat whole-grain foods more often.  A healthy meal plan should contain fiber. Fiber is the part of grains, fruits, and vegetables that is not broken down by your body. Whole-grain foods are healthy and provide extra fiber in your diet. Some examples of whole-grain foods are whole-wheat breads and pastas, oatmeal, brown rice, and bulgur.  Eat a variety of vegetables every day.  Include dark, leafy greens such as spinach, kale, yajaira greens, and mustard greens. Eat yellow and orange vegetables such as carrots, sweet potatoes, and winter squash.   Eat a variety of fruits every  day.  Choose fresh or canned fruit (canned in its own juice or light syrup) instead of juice. Fruit juice has very little or no fiber.  Eat low-fat dairy foods.  Drink fat-free (skim) milk or 1% milk. Eat fat-free yogurt and low-fat cottage cheese. Try low-fat cheeses such as mozzarella and other reduced-fat cheeses.  Choose meat and other protein foods that are low in fat.  Choose beans or other legumes such as split peas or lentils. Choose fish, skinless poultry (chicken or turkey), or lean cuts of red meat (beef or pork). Before you cook meat or poultry, cut off any visible fat.   Use less fat and oil.  Try baking foods instead of frying them. Add less fat, such as margarine, sour cream, regular salad dressing and mayonnaise to foods. Eat fewer high-fat foods. Some examples of high-fat foods include french fries, doughnuts, ice cream, and cakes.  Eat fewer sweets.  Limit foods and drinks that are high in sugar. This includes candy, cookies, regular soda, and sweetened drinks.  Exercise:  Exercise at least 30 minutes per day on most days of the week. Some examples of exercise include walking, biking, dancing, and swimming. You can also fit in more physical activity by taking the stairs instead of the elevator or parking farther away from stores. Ask your healthcare provider about the best exercise plan for you.      © Copyright [employer] 2018 Information is for End User's use only and may not be sold, redistributed or otherwise used for commercial purposes. All illustrations and images included in CareNotes® are the copyrighted property of [employer] Inc[employer] or [employer]

## 2024-07-09 NOTE — ASSESSMENT & PLAN NOTE
Declines further COVID-19 vaccines  Will plan for PCV 20 vaccine October 2026  Informed about new RSV vaccine, she will consider  Immunizations otherwise up-to-date

## 2024-07-19 ENCOUNTER — TELEPHONE (OUTPATIENT)
Age: 69
End: 2024-07-19

## 2024-07-19 NOTE — TELEPHONE ENCOUNTER
Left a message for the pt letting her know that she can get the lab work done at her convenience any time in December or January.

## 2024-07-19 NOTE — TELEPHONE ENCOUNTER
Pt called, she is scheduled at the beginning of January for a follow up with Dr. Yang, but her lab work has an expected date of 1/29. Please advise if her appointment should be RS, or if pcp can change the date on the labs.    Thank you!

## 2024-08-08 PROBLEM — Z00.00 MEDICARE ANNUAL WELLNESS VISIT, SUBSEQUENT: Status: RESOLVED | Noted: 2023-04-21 | Resolved: 2024-08-08

## 2024-10-03 DIAGNOSIS — F41.9 ANXIETY: ICD-10-CM

## 2024-10-03 DIAGNOSIS — I10 PRIMARY HYPERTENSION: ICD-10-CM

## 2024-10-03 DIAGNOSIS — E78.5 DYSLIPIDEMIA: ICD-10-CM

## 2024-10-03 DIAGNOSIS — I10 ESSENTIAL HYPERTENSION: ICD-10-CM

## 2024-10-03 RX ORDER — ESCITALOPRAM OXALATE 10 MG/1
10 TABLET ORAL DAILY
Qty: 90 TABLET | Refills: 1 | Status: SHIPPED | OUTPATIENT
Start: 2024-10-03

## 2024-10-03 RX ORDER — LISINOPRIL 20 MG/1
20 TABLET ORAL DAILY
Qty: 90 TABLET | Refills: 1 | Status: SHIPPED | OUTPATIENT
Start: 2024-10-03

## 2024-10-03 RX ORDER — ATORVASTATIN CALCIUM 10 MG/1
10 TABLET, FILM COATED ORAL DAILY
Qty: 90 TABLET | Refills: 1 | Status: SHIPPED | OUTPATIENT
Start: 2024-10-03

## 2024-10-03 RX ORDER — METOPROLOL SUCCINATE 50 MG/1
50 TABLET, EXTENDED RELEASE ORAL DAILY
Qty: 90 TABLET | Refills: 1 | Status: SHIPPED | OUTPATIENT
Start: 2024-10-03

## 2024-11-30 DIAGNOSIS — R07.81 RIB PAIN: ICD-10-CM

## 2024-12-01 RX ORDER — MELOXICAM 15 MG/1
15 TABLET ORAL DAILY PRN
Qty: 30 TABLET | Refills: 5 | Status: SHIPPED | OUTPATIENT
Start: 2024-12-01

## 2024-12-09 ENCOUNTER — HOSPITAL ENCOUNTER (OUTPATIENT)
Dept: RADIOLOGY | Facility: MEDICAL CENTER | Age: 69
Discharge: HOME | End: 2024-12-09
Payer: COMMERCIAL

## 2024-12-09 VITALS — HEIGHT: 60 IN | BODY MASS INDEX: 32.2 KG/M2 | WEIGHT: 164 LBS

## 2024-12-09 DIAGNOSIS — Z12.31 ENCOUNTER FOR SCREENING MAMMOGRAM FOR MALIGNANT NEOPLASM OF BREAST: ICD-10-CM

## 2024-12-09 DIAGNOSIS — Z12.2 SCREENING FOR LUNG CANCER: ICD-10-CM

## 2024-12-09 DIAGNOSIS — R91.8 LUNG NODULES: ICD-10-CM

## 2024-12-09 PROCEDURE — 77067 SCR MAMMO BI INCL CAD: CPT

## 2024-12-09 PROCEDURE — 71250 CT THORAX DX C-: CPT

## 2024-12-09 PROCEDURE — G1004 CDSM NDSC: HCPCS

## 2024-12-09 PROCEDURE — 77063 BREAST TOMOSYNTHESIS BI: CPT

## 2024-12-10 ENCOUNTER — RESULTS FOLLOW-UP (OUTPATIENT)
Dept: FAMILY MEDICINE CLINIC | Facility: MEDICAL CENTER | Age: 69
End: 2024-12-10

## 2024-12-17 ENCOUNTER — RESULTS FOLLOW-UP (OUTPATIENT)
Dept: FAMILY MEDICINE CLINIC | Facility: MEDICAL CENTER | Age: 69
End: 2024-12-17

## 2025-01-01 DIAGNOSIS — E78.5 DYSLIPIDEMIA: ICD-10-CM

## 2025-01-01 DIAGNOSIS — F41.9 ANXIETY: ICD-10-CM

## 2025-01-01 DIAGNOSIS — I10 ESSENTIAL HYPERTENSION: ICD-10-CM

## 2025-01-01 DIAGNOSIS — I10 PRIMARY HYPERTENSION: ICD-10-CM

## 2025-01-01 RX ORDER — LISINOPRIL 20 MG/1
20 TABLET ORAL DAILY
Qty: 90 TABLET | Refills: 1 | Status: SHIPPED | OUTPATIENT
Start: 2025-01-01

## 2025-01-01 RX ORDER — ESCITALOPRAM OXALATE 10 MG/1
10 TABLET ORAL DAILY
Qty: 90 TABLET | Refills: 1 | Status: SHIPPED | OUTPATIENT
Start: 2025-01-01

## 2025-01-01 RX ORDER — METOPROLOL SUCCINATE 50 MG/1
50 TABLET, EXTENDED RELEASE ORAL DAILY
Qty: 90 TABLET | Refills: 1 | Status: SHIPPED | OUTPATIENT
Start: 2025-01-01

## 2025-01-01 RX ORDER — ATORVASTATIN CALCIUM 10 MG/1
10 TABLET, FILM COATED ORAL DAILY
Qty: 90 TABLET | Refills: 1 | Status: SHIPPED | OUTPATIENT
Start: 2025-01-01

## 2025-01-10 ENCOUNTER — OFFICE VISIT (OUTPATIENT)
Dept: FAMILY MEDICINE CLINIC | Facility: MEDICAL CENTER | Age: 70
End: 2025-01-10
Payer: COMMERCIAL

## 2025-01-10 ENCOUNTER — APPOINTMENT (OUTPATIENT)
Dept: RADIOLOGY | Facility: MEDICAL CENTER | Age: 70
End: 2025-01-10
Payer: COMMERCIAL

## 2025-01-10 VITALS
HEIGHT: 60 IN | OXYGEN SATURATION: 96 % | TEMPERATURE: 97.1 F | BODY MASS INDEX: 31.53 KG/M2 | SYSTOLIC BLOOD PRESSURE: 190 MMHG | DIASTOLIC BLOOD PRESSURE: 86 MMHG | RESPIRATION RATE: 18 BRPM | WEIGHT: 160.6 LBS | HEART RATE: 62 BPM

## 2025-01-10 DIAGNOSIS — S29.9XXA RIB INJURY: ICD-10-CM

## 2025-01-10 DIAGNOSIS — R09.1 PLEURISY: ICD-10-CM

## 2025-01-10 DIAGNOSIS — Z23 ENCOUNTER FOR IMMUNIZATION: ICD-10-CM

## 2025-01-10 DIAGNOSIS — R07.81 RIB PAIN: ICD-10-CM

## 2025-01-10 DIAGNOSIS — I10 PRIMARY HYPERTENSION: Primary | ICD-10-CM

## 2025-01-10 PROCEDURE — G0008 ADMIN INFLUENZA VIRUS VAC: HCPCS | Performed by: STUDENT IN AN ORGANIZED HEALTH CARE EDUCATION/TRAINING PROGRAM

## 2025-01-10 PROCEDURE — 90662 IIV NO PRSV INCREASED AG IM: CPT | Performed by: STUDENT IN AN ORGANIZED HEALTH CARE EDUCATION/TRAINING PROGRAM

## 2025-01-10 PROCEDURE — 71100 X-RAY EXAM RIBS UNI 2 VIEWS: CPT

## 2025-01-10 PROCEDURE — 71046 X-RAY EXAM CHEST 2 VIEWS: CPT

## 2025-01-10 PROCEDURE — 99214 OFFICE O/P EST MOD 30 MIN: CPT | Performed by: STUDENT IN AN ORGANIZED HEALTH CARE EDUCATION/TRAINING PROGRAM

## 2025-01-10 NOTE — PATIENT INSTRUCTIONS
Patient Education     Preventing falls in adults   The Basics   Written by the doctors and editors at [facility]   Am I at risk of falling? -- Falls can happen to anyone, no matter how old they are. Several things can increase your risk of a fall, including:   Vision problems   Having certain chronic health conditions, being sick, having recently been in the hospital, or having had surgery   Changing the medicines you take   An unsafe or unfamiliar setting (for example, a room with rugs or furniture that might trip you, or an area you don't know well)  Your risk of falling increases as you grow older. That's because getting older can make it harder to walk steadily and keep your balance. Also, the effects of falls are more serious for older people.  Overall, 3 to 4 out of every 10 people over the age of 65 fall each year. Many people who fracture a hip never fully recover to how they were before the fracture. If you have fallen in the past, you are at higher risk of falling again.  How can my doctor help me avoid falling? -- Your doctor can talk to you about the following things:   Past falls - It is important to tell your doctor about any times that you have fallen or almost fallen. They can then suggest ways to prevent another fall.   Your health conditions - Some health problems can put you at risk of falling. These include conditions that affect eyesight, hearing, muscle strength, or balance.   What to do if you are in the hospital - Falls can happen in the hospital because you are in an unfamiliar place. You might feel unsteady or drowsy from medicines or anesthesia. Or you might be attached to catheters or IV tubing that you could trip over. You are also likely to be weaker than usual.   Your medicines - Certain medicines can increase the risk of falling. These include some medicines for sleeping problems, anxiety, high blood pressure, or depression. Adding new medicines, or changing doses of some medicines, can  also affect your risk of falling.  The more your doctor knows about your situation, the better they can help you. For example, if you fell because you have a condition that causes pain, your doctor might suggest treatments to help with the pain. Or if any of your medicines are making you dizzy and more likely to fall, your doctor might switch you to a different medicine.  Is there anything I can do on my own? -- Yes. To help keep from falling, you can:   Make your home safer - To avoid falling at home, get rid of things that might make you trip or slip. This can include furniture, electrical cords, clutter, and loose rugs (figure 1). Keep your home well lit so you can easily see where you are going. Avoid storing things in high places so you don't have to reach or climb.   Wear non-slip socks or sturdy shoes that fit well - Wearing shoes with high heels or slippery soles, or shoes that are too loose, can lead to falls. Walking around in bare feet, or only socks, can also increase your risk of falling.   Take vitamin D pills - Taking vitamin D might lower the risk of falls in older people. This is because vitamin D helps make bones and muscles stronger. Your doctor can talk to you about whether you should take extra vitamin D, and how much.   Stay active - Moving your body regularly can help lower your risk of falling. It might also help prevent you from getting hurt if you do fall. It is best to do a few different activities that help with both strength and balance. There are many kinds of exercise that can be safe for older people. These include walking, swimming, and bakari chi (a Chinese martial art involving slow, gentle movements).   Use a cane, walker, or other safety devices - If your doctor recommends that you use a cane or walker, make sure that it's the right size and you know how to use it. There are other devices that might help you avoid falling, too. These include grab bars or a sturdy seat for the  shower, non-slip bath mats, and hand rails or treads for the stairs (to prevent slipping).   Take extra care if you have had surgery or are in the hospital - Ask for help with getting out of bed, getting up from a chair, or going to the bathroom. Your body needs time to heal, and it's normal to need help with these things while you recover. It can also help to keep your belongings within reach, and avoid walking around in the dark. If you need help, use the call button instead of trying to get up.  If you worry that you could fall, you can get an emergency alert system. This is usually an alarm button that lets you call for help if you fall and can't get up. If you live alone and you do not have an emergency alert system, always carry a cell phone or portable phone with you when moving around the house.  How do I get up from a fall? -- If you do fall, try not to be afraid. Stay calm, and take slow, deep breaths. If someone is near you, call for help right away. If you have an emergency alert system, use it.  Try to find out if you are hurt. If you are hurt badly, trying to get up can make things worse. If you do not think that you are hurt badly, come up with a plan to get up off of the floor.  Some tips to get up after a fall if you are alone:   Look around you for a piece of sturdy furniture such as a couch or chair. Try to move your body to the furniture. You might need to scoot, crawl, or roll to get close. Do these movements very slowly. If you feel any sharp pains, you might need to stop.   Once you are close to the furniture, roll onto your side. Pull your knees up toward your chest, and try to get on your hands and knees.   Put your hands on the seat of the couch or chair.   Bring 1 leg forward so the foot is flat on the floor. If you have a stronger leg, move this leg first.   Now, try to stand up. Once both feet are on the ground, slowly turn and lower yourself to sit down on the seat.  What should I do  after a fall? -- If you had a fall, see your doctor right away, even if you aren't hurt. Your doctor can try to figure out what caused you to fall, and how likely you are to fall again. They will do an exam and talk to you about your health problems, medicines, and activities. They can also check how well you walk, move, and balance. Then, they can suggest things you can do to lower your risk of falling again.  Many older people have a hard time recovering after a fall. Doing things to prevent falling can help you protect your health and independence.  All topics are updated as new evidence becomes available and our peer review process is complete.  This topic retrieved from [employer] on: Apr 04, 2024.  Topic 54983 Version 25.0  Release: 32.2.4 - C32.93  © 2024 UpToDate, Inc. and/or its affiliates. All rights reserved.  figure 1: How to avoid falling at home     This picture shows some of the things that can cause a fall in your home. Look around and remove any loose rugs, electrical cords, clutter, or furniture that could trip you.  Graphic 14564 Version 1.0  Consumer Information Use and Disclaimer   Disclaimer: This generalized information is a limited summary of diagnosis, treatment, and/or medication information. It is not meant to be comprehensive and should be used as a tool to help the user understand and/or assess potential diagnostic and treatment options. It does NOT include all information about conditions, treatments, medications, side effects, or risks that may apply to a specific patient. It is not intended to be medical advice or a substitute for the medical advice, diagnosis, or treatment of a health care provider based on the health care provider's examination and assessment of a patient's specific and unique circumstances. Patients must speak with a health care provider for complete information about their health, medical questions, and treatment options, including any risks or benefits regarding  use of medications. This information does not endorse any treatments or medications as safe, effective, or approved for treating a specific patient. UpToDate, Inc. and its affiliates disclaim any warranty or liability relating to this information or the use thereof.The use of this information is governed by the Terms of Use, available at https://www."[employer].com/en/know/clinical-effectiveness-terms. 2024© UpToDate, Inc. and its affiliates and/or licensors. All rights reserved.  Copyright   © 2024 Up[employer]Date, Inc. and/or its affiliates. All rights reserved.

## 2025-01-10 NOTE — PROGRESS NOTES
"  [facility] - Clinic Note  Tiffany Yang DO, 01/10/25     Saba Eid MRN: [id number] : 1955 Age: 69 y.o.     Assessment/Plan     1. Primary hypertension (Primary)    -Elevated blood pressure reading today in office confirmed upon repeat however, patient has not yet taken Toprol-XL 50 mg p.o. daily.  -Continue lisinopril 20 mg p.o. daily advised to take dose of Toprol-XL 50 mg promptly today  -Return to office shortly Monday, 2025 for blood pressure recheck, adjust regimen as appropriate  -Patient made aware of signs and symptoms in which case to proceed to ER    2. Pleurisy    - XR chest pa and lateral; Future    3. Rib injury    - XR ribs 2 vw left; Future    4. Rib pain    - XR ribs 2 vw left; Future    5. Encounter for immunization    -Counseled about influenza vaccine, she would like to receive today  - influenza vaccine, high-dose, PF 0.5 mL (Fluzone High Dose)      Saba Eid acknowledged understanding of treatment plan, all questions answered.    Subjective      Saba Eid is a 69 y.o. female who presented for follow-up today.  She was noted to have elevated blood pressure reading.  Patient denies any current visual disturbance, headache, chest pain, shortness of breath or lower extremity swelling.  Patient states she does have occasional headache for the past month that \" go away really fast\", not current.  Patient states during Zhao time she was consuming alcohol and fell onto her .  She sustained a right rib injury.  Patient mentions she does have pain only when \" deep breath hurts\".     The following portions of the patient's history were reviewed and updated as appropriate: allergies, current medications, past family history, past medical history, past social history, past surgical history and problem list.     Past Medical History:   Diagnosis Date    GERD (gastroesophageal reflux disease)     Hyperlipidemia     Hypertension     UTI (urinary tract " infection)        No Known Allergies    Past Surgical History:   Procedure Laterality Date    DE COLONOSCOPY FLX DX W/COLLJ SPEC WHEN PFRMD N/A 2018    Procedure: EGD AND COLONOSCOPY;  Surgeon: Valerie Swenson MD;  [facility];  Service: Gastroenterology       Family History   Problem Relation Age of Onset    No Known Problems Sister     Heart disease Mother     Diabetes Father         Diabetes mellitus    Heart disease Father     Colon cancer Maternal Aunt     Colon cancer Maternal Aunt     No Known Problems Daughter     No Known Problems Maternal Grandmother     No Known Problems Maternal Grandfather     No Known Problems Paternal Grandmother     No Known Problems Paternal Grandfather     No Known Problems Brother     No Known Problems Half-Sister        Social History     Socioeconomic History    Marital status: /Civil Union     Spouse name: None    Number of children: None    Years of education: None    Highest education level: None   Occupational History    None   Tobacco Use    Smoking status: Every Day     Average packs/day: 1.5 packs/day for 45.0 years (67.5 ttl pk-yrs)     Types: E-Cigarettes, Cigarettes     Start date: 10/3/1973     Last attempt to quit: 10/3/2018     Years since quittin.2    Smokeless tobacco: Former     Quit date: 11/3/2018   Vaping Use    Vaping status: Never Used   Substance and Sexual Activity    Alcohol use: Yes     Alcohol/week: 1.0 standard drink of alcohol     Types: 1 Glasses of wine per week     Comment: per month; (Never drank alcohol - per [employer])    Drug use: Not Currently    Sexual activity: None   Other Topics Concern    None   Social History Narrative    Caffeine use     Social Drivers of Health     Financial Resource Strain: Low Risk  (2023)    Overall Financial Resource Strain (CARDIA)     Difficulty of Paying Living Expenses: Not hard at all   Food Insecurity: No Food Insecurity (2024)    Nursing - Inadequate Food Risk  Classification     Worried About Running Out of Food in the Last Year: Never true     Ran Out of Food in the Last Year: Never true     Ran Out of Food in the Last Year: Not on file   Transportation Needs: No Transportation Needs (7/9/2024)    PRAPARE - Transportation     Lack of Transportation (Medical): No     Lack of Transportation (Non-Medical): No   Physical Activity: Not on file   Stress: Not on file   Social Connections: Not on file   Intimate Partner Violence: Not on file   Housing Stability: Low Risk  (7/9/2024)    Housing Stability Vital Sign     Unable to Pay for Housing in the Last Year: No     Number of Times Moved in the Last Year: 1     Homeless in the Last Year: No       Current Outpatient Medications   Medication Sig Dispense Refill    aspirin (ECOTRIN LOW STRENGTH) 81 mg EC tablet Take 81 mg by mouth daily      atorvastatin (LIPITOR) 10 mg tablet TAKE 1 TABLET BY MOUTH EVERY DAY 90 tablet 1    calcium-vitamin D 250-100 MG-UNIT per tablet Take 1 tablet by mouth 2 (two) times a day      co-enzyme Q-10 30 MG capsule Take 30 mg by mouth daily      Cranberry 1000 MG CAPS Take by mouth      escitalopram (LEXAPRO) 10 mg tablet TAKE 1 TABLET BY MOUTH EVERY DAY 90 tablet 1    famotidine (PEPCID) 20 mg tablet Take 20 mg by mouth 2 (two) times a day (Patient taking differently: Take 20 mg by mouth daily)      fluticasone (FLONASE) 50 mcg/act nasal spray 1 spray into each nostril daily      glucosamine-chondroitin 500-400 MG tablet Take 1 tablet by mouth 3 (three) times a day      lisinopril (ZESTRIL) 20 mg tablet TAKE 1 TABLET BY MOUTH EVERY DAY 90 tablet 1    LORazepam (ATIVAN) 1 mg tablet Take 0.5 tablets (0.5 mg total) by mouth every 8 (eight) hours as needed for anxiety 30 tablet 0    metoprolol succinate (TOPROL-XL) 50 mg 24 hr tablet TAKE 1 TABLET BY MOUTH EVERY DAY 90 tablet 1    Multiple Vitamins-Minerals (MULTIVITAMIN WITH MINERALS) tablet Take 1 tablet by mouth daily      naproxen (NAPROSYN) 375 mg  tablet Take 1 tablet (375 mg total) by mouth 2 (two) times a day with meals 20 tablet 0    Omega-3 Fatty Acids (FISH OIL) 1,000 mg Take 1,000 mg by mouth daily      vitamin E 100 UNIT capsule Take 100 Units by mouth daily      Ascorbic Acid (VITAMIN C) 100 MG tablet Take 100 mg by mouth daily (Patient not taking: Reported on 1/10/2025)      lidocaine (Lidoderm) 5 % Apply 1 patch topically over 12 hours daily Remove & Discard patch within 12 hours or as directed by MD (Patient not taking: Reported on 1/10/2025) 5 patch 0    meloxicam (MOBIC) 15 mg tablet TAKE 1 TABLET BY MOUTH DAILY AS NEEDED FOR MODERATE PAIN. (Patient not taking: Reported on 1/10/2025) 30 tablet 5     No current facility-administered medications for this visit.       Review of Systems     As noted in HPI    Objective      BP (!) 190/86 (BP Location: Left arm)   Pulse 62   Temp (!) 97.1 °F (36.2 °C) (Temporal)   Resp 18   Ht 5' (1.524 m)   Wt 72.8 kg (160 lb 9.6 oz)   SpO2 96%   BMI 31.37 kg/m²     Physical Exam  Vitals reviewed.   Constitutional:       General: She is not in acute distress.     Appearance: Normal appearance.   HENT:      Head: Normocephalic and atraumatic.   Eyes:      Conjunctiva/sclera: Conjunctivae normal.   Cardiovascular:      Rate and Rhythm: Normal rate and regular rhythm.      Pulses: Normal pulses.      Heart sounds: Normal heart sounds.   Pulmonary:      Effort: Pulmonary effort is normal. No respiratory distress.      Breath sounds: Normal breath sounds. No wheezing or rales.   Musculoskeletal:      Right lower leg: No edema.      Left lower leg: No edema.      Comments: Right-sided rib tenderness   Skin:     General: Skin is warm and dry.   Neurological:      Mental Status: She is alert and oriented to person, place, and time.   Psychiatric:         Mood and Affect: Mood normal.         Behavior: Behavior normal.         Thought Content: Thought content normal.             Some portions of this record may have  "been generated with voice recognition software. There may be translation, syntax, or grammatical errors. Occasional wrong word or \"sound-a-like\" substitutions may have occurred due to the inherent limitations of the voice recognition software. Read the chart carefully and recognize, using context, where substations may have occurred. If you have any questions, please contact the dictating provider for clarification or correction, as needed.  "

## 2025-01-13 ENCOUNTER — CLINICAL SUPPORT (OUTPATIENT)
Dept: FAMILY MEDICINE CLINIC | Facility: MEDICAL CENTER | Age: 70
End: 2025-01-13
Payer: COMMERCIAL

## 2025-01-13 ENCOUNTER — TELEPHONE (OUTPATIENT)
Dept: FAMILY MEDICINE CLINIC | Facility: MEDICAL CENTER | Age: 70
End: 2025-01-13

## 2025-01-13 VITALS — DIASTOLIC BLOOD PRESSURE: 76 MMHG | SYSTOLIC BLOOD PRESSURE: 146 MMHG

## 2025-01-13 DIAGNOSIS — I10 PRIMARY HYPERTENSION: Primary | ICD-10-CM

## 2025-01-13 PROCEDURE — 99211 OFF/OP EST MAY X REQ PHY/QHP: CPT | Performed by: STUDENT IN AN ORGANIZED HEALTH CARE EDUCATION/TRAINING PROGRAM

## 2025-01-16 ENCOUNTER — RESULTS FOLLOW-UP (OUTPATIENT)
Dept: FAMILY MEDICINE CLINIC | Facility: MEDICAL CENTER | Age: 70
End: 2025-01-16

## 2025-01-27 ENCOUNTER — TELEPHONE (OUTPATIENT)
Dept: FAMILY MEDICINE CLINIC | Facility: MEDICAL CENTER | Age: 70
End: 2025-01-27

## 2025-01-27 NOTE — TELEPHONE ENCOUNTER
Patient stopped by the office. Was in for BP check on 1/13, however she wasn't told what she should do: Change meds, continue regimen. Please advise.

## 2025-01-29 NOTE — TELEPHONE ENCOUNTER
May you have patient send me home BP readings by the end of this week?  If blood pressures consistently above 150/90 will increase lisinopril to 40 mg daily, she should continue lisinopril and Toprol-XL at current dose at this time.

## 2025-02-04 DIAGNOSIS — I10 PRIMARY HYPERTENSION: Primary | ICD-10-CM

## 2025-02-04 RX ORDER — LISINOPRIL 30 MG/1
30 TABLET ORAL DAILY
Qty: 90 TABLET | Refills: 0 | Status: SHIPPED | OUTPATIENT
Start: 2025-02-04

## 2025-02-24 DIAGNOSIS — F41.9 ANXIETY: ICD-10-CM

## 2025-02-24 NOTE — TELEPHONE ENCOUNTER
Reason for call:   [x] Refill-Patient's  passed away last night. Requesting refill of lorazepam.   [] Prior Auth  [] Other:     Office:   [x] PCP/Provider - [facility] [place]  Authorized By: Tiffany Yang DO  [] Specialty/Provider -     Medication: LORazepam (ATIVAN) 1 mg tablet     Dose/Frequency: Take 0.5 tablets (0.5 mg total) by mouth every 8 (eight) hours as needed for anxiety     Quantity: 30    Pharmacy: [facility]/pharm[facility] #[id number] - [place], [place] - [address]     Does the patient have enough for 3 days?   [] Yes   [x] No - Send as HP to POD

## 2025-02-25 RX ORDER — LORAZEPAM 1 MG/1
0.5 TABLET ORAL DAILY PRN
Qty: 15 TABLET | Refills: 0 | Status: SHIPPED | OUTPATIENT
Start: 2025-02-25

## 2025-02-25 NOTE — TELEPHONE ENCOUNTER
Patient called in to check on her Lorazepam refill. I let her know it was sent over to the doctor as a high priority and that I would send a follow up message and she verbalized understanding. Please send in as soon as possible.  [facility] [place]

## 2025-03-26 DIAGNOSIS — I10 PRIMARY HYPERTENSION: ICD-10-CM

## 2025-03-26 RX ORDER — LISINOPRIL 30 MG/1
30 TABLET ORAL DAILY
Qty: 90 TABLET | Refills: 1 | Status: SHIPPED | OUTPATIENT
Start: 2025-03-26

## 2025-05-01 DIAGNOSIS — R07.81 RIB PAIN: ICD-10-CM

## 2025-05-01 NOTE — TELEPHONE ENCOUNTER
Pt aware and will look into completing bw next week. Pt commented that they may not need the meloxicam refilled

## 2025-05-08 RX ORDER — MELOXICAM 15 MG/1
15 TABLET ORAL DAILY PRN
Qty: 30 TABLET | Refills: 5 | OUTPATIENT
Start: 2025-05-08

## 2025-05-08 NOTE — TELEPHONE ENCOUNTER
Pt returned missed call. Pt states she will try to complete her ordered lab work tomorrow and state she does not need this medication refilled anymore.    Please advise.

## 2025-05-09 ENCOUNTER — APPOINTMENT (OUTPATIENT)
Dept: LAB | Facility: MEDICAL CENTER | Age: 70
End: 2025-05-09
Attending: STUDENT IN AN ORGANIZED HEALTH CARE EDUCATION/TRAINING PROGRAM
Payer: COMMERCIAL

## 2025-05-09 DIAGNOSIS — Z13.29 SCREENING FOR THYROID DISORDER: ICD-10-CM

## 2025-05-09 DIAGNOSIS — E78.00 PURE HYPERCHOLESTEROLEMIA: ICD-10-CM

## 2025-05-09 DIAGNOSIS — I10 PRIMARY HYPERTENSION: ICD-10-CM

## 2025-05-09 LAB
ANION GAP SERPL CALCULATED.3IONS-SCNC: 9 MMOL/L (ref 4–13)
BUN SERPL-MCNC: 14 MG/DL (ref 5–25)
CALCIUM SERPL-MCNC: 9.8 MG/DL (ref 8.4–10.2)
CHLORIDE SERPL-SCNC: 102 MMOL/L (ref 96–108)
CHOLEST SERPL-MCNC: 206 MG/DL (ref ?–200)
CO2 SERPL-SCNC: 27 MMOL/L (ref 21–32)
CREAT SERPL-MCNC: 0.88 MG/DL (ref 0.6–1.3)
GFR SERPL CREATININE-BSD FRML MDRD: 65 ML/MIN/1.73SQ M
GLUCOSE P FAST SERPL-MCNC: 120 MG/DL (ref 65–99)
HDLC SERPL-MCNC: 71 MG/DL
LDLC SERPL CALC-MCNC: 107 MG/DL (ref 0–100)
POTASSIUM SERPL-SCNC: 4.8 MMOL/L (ref 3.5–5.3)
SODIUM SERPL-SCNC: 138 MMOL/L (ref 135–147)
TRIGL SERPL-MCNC: 141 MG/DL (ref ?–150)
TSH SERPL DL<=0.05 MIU/L-ACNC: 1.32 UIU/ML (ref 0.45–4.5)

## 2025-05-09 PROCEDURE — 84443 ASSAY THYROID STIM HORMONE: CPT

## 2025-05-09 PROCEDURE — 80048 BASIC METABOLIC PNL TOTAL CA: CPT

## 2025-05-09 PROCEDURE — 36415 COLL VENOUS BLD VENIPUNCTURE: CPT

## 2025-05-09 PROCEDURE — 80061 LIPID PANEL: CPT

## 2025-07-07 DIAGNOSIS — F41.9 ANXIETY: ICD-10-CM

## 2025-07-15 RX ORDER — LORAZEPAM 1 MG/1
TABLET ORAL
Qty: 15 TABLET | Refills: 0 | Status: SHIPPED | OUTPATIENT
Start: 2025-07-15

## 2025-07-16 NOTE — TELEPHONE ENCOUNTER
Pt stated that her last refill in February was for 15 pills, and she usually would get 30, and that was the time her  , so she used more.